# Patient Record
Sex: FEMALE | Race: BLACK OR AFRICAN AMERICAN | NOT HISPANIC OR LATINO | Employment: FULL TIME | URBAN - METROPOLITAN AREA
[De-identification: names, ages, dates, MRNs, and addresses within clinical notes are randomized per-mention and may not be internally consistent; named-entity substitution may affect disease eponyms.]

---

## 2017-12-22 ENCOUNTER — HOSPITAL ENCOUNTER (OUTPATIENT)
Dept: RADIOLOGY | Facility: OTHER | Age: 47
Discharge: HOME | End: 2017-12-22
Attending: SPECIALIST
Payer: COMMERCIAL

## 2017-12-22 DIAGNOSIS — Z12.31 VISIT FOR SCREENING MAMMOGRAM: ICD-10-CM

## 2017-12-22 PROCEDURE — 77067 SCR MAMMO BI INCL CAD: CPT | Mod: 26,,, | Performed by: INTERNAL MEDICINE

## 2017-12-22 PROCEDURE — 77067 SCR MAMMO BI INCL CAD: CPT | Mod: TC

## 2017-12-22 PROCEDURE — 77063 BREAST TOMOSYNTHESIS BI: CPT | Mod: 26,,, | Performed by: INTERNAL MEDICINE

## 2018-12-24 ENCOUNTER — HOSPITAL ENCOUNTER (OUTPATIENT)
Dept: RADIOLOGY | Facility: OTHER | Age: 48
Discharge: HOME | End: 2018-12-24
Attending: SPECIALIST
Payer: COMMERCIAL

## 2018-12-24 VITALS — WEIGHT: 280 LBS | BODY MASS INDEX: 46.65 KG/M2 | HEIGHT: 65 IN

## 2018-12-24 DIAGNOSIS — Z12.31 VISIT FOR SCREENING MAMMOGRAM: ICD-10-CM

## 2018-12-24 PROCEDURE — 77063 BREAST TOMOSYNTHESIS BI: CPT | Mod: TC

## 2018-12-24 PROCEDURE — 77063 BREAST TOMOSYNTHESIS BI: CPT | Mod: 26,,, | Performed by: RADIOLOGY

## 2018-12-24 PROCEDURE — 77067 SCR MAMMO BI INCL CAD: CPT | Mod: 26,,, | Performed by: RADIOLOGY

## 2020-07-28 DIAGNOSIS — Z12.31 VISIT FOR SCREENING MAMMOGRAM: Primary | ICD-10-CM

## 2020-12-17 ENCOUNTER — TELEPHONE (OUTPATIENT)
Dept: ENDOSCOPY | Facility: HOSPITAL | Age: 50
End: 2020-12-17

## 2020-12-17 NOTE — TELEPHONE ENCOUNTER
Referral for screening colonoscopy received from , [facility]- see below. Called patient to schedule procedure. Left voicemail message with direct number to call back.      Appointment Access  Message Contents   Patrica VARGAS [facility] Endo Schedulers   Caller: Unspecified (3 weeks ago)             Good Afternoon,     Dr. Rodrigues would like to refer the following patient to [facility] for a colonoscopy. I have a scanned the following patient's referral and records into . If there are any further questions in regards to the patient, please contact me at my extension.     Thank you,   Patrica Acosta

## 2020-12-30 ENCOUNTER — HOSPITAL ENCOUNTER (OUTPATIENT)
Dept: RADIOLOGY | Facility: CLINIC | Age: 50
Discharge: HOME | End: 2020-12-30
Attending: SPECIALIST
Payer: COMMERCIAL

## 2020-12-30 DIAGNOSIS — Z12.31 VISIT FOR SCREENING MAMMOGRAM: ICD-10-CM

## 2020-12-30 PROCEDURE — 77067 MAMMO DIGITAL SCREENING BILAT WITH TOMOSYNTHESIS_CAD: ICD-10-PCS | Mod: 26,,, | Performed by: RADIOLOGY

## 2020-12-30 PROCEDURE — 77067 SCR MAMMO BI INCL CAD: CPT | Mod: 26,,, | Performed by: RADIOLOGY

## 2020-12-30 PROCEDURE — 77067 SCR MAMMO BI INCL CAD: CPT | Mod: TC,PO

## 2020-12-30 PROCEDURE — 77063 MAMMO DIGITAL SCREENING BILAT WITH TOMOSYNTHESIS_CAD: ICD-10-PCS | Mod: 26,,, | Performed by: RADIOLOGY

## 2020-12-30 PROCEDURE — 77063 BREAST TOMOSYNTHESIS BI: CPT | Mod: 26,,, | Performed by: RADIOLOGY

## 2021-05-17 ENCOUNTER — TELEPHONE (OUTPATIENT)
Dept: SURGERY | Facility: CLINIC | Age: 51
End: 2021-05-17

## 2021-05-27 ENCOUNTER — TELEPHONE (OUTPATIENT)
Dept: ENDOSCOPY | Facility: HOSPITAL | Age: 51
End: 2021-05-27

## 2021-05-27 DIAGNOSIS — Z12.11 SCREEN FOR COLON CANCER: Primary | ICD-10-CM

## 2021-05-27 DIAGNOSIS — Z12.11 ENCOUNTER FOR SCREENING COLONOSCOPY: Primary | ICD-10-CM

## 2021-05-27 RX ORDER — CHOLECALCIFEROL (VITAMIN D3) 1250 MCG
TABLET ORAL
COMMUNITY
End: 2024-01-24 | Stop reason: SDUPTHER

## 2021-05-27 RX ORDER — POLYETHYLENE GLYCOL 3350, SODIUM SULFATE ANHYDROUS, SODIUM BICARBONATE, SODIUM CHLORIDE, POTASSIUM CHLORIDE 236; 22.74; 6.74; 5.86; 2.97 G/4L; G/4L; G/4L; G/4L; G/4L
4 POWDER, FOR SOLUTION ORAL ONCE
Qty: 4000 ML | Refills: 0 | Status: SHIPPED | OUTPATIENT
Start: 2021-05-27 | End: 2021-05-27

## 2021-05-27 RX ORDER — FLUTICASONE PROPIONATE 250 UG/1
1 POWDER, METERED RESPIRATORY (INHALATION) 2 TIMES DAILY
COMMUNITY
End: 2023-12-20

## 2021-05-27 RX ORDER — SODIUM, POTASSIUM,MAG SULFATES 17.5-3.13G
1 SOLUTION, RECONSTITUTED, ORAL ORAL DAILY
Qty: 1 KIT | Refills: 0 | Status: SHIPPED | OUTPATIENT
Start: 2021-05-27 | End: 2021-05-29

## 2021-05-27 RX ORDER — ALBUTEROL SULFATE 90 UG/1
2 AEROSOL, METERED RESPIRATORY (INHALATION) EVERY 6 HOURS PRN
COMMUNITY
End: 2023-12-20 | Stop reason: SDUPTHER

## 2021-06-25 ENCOUNTER — HOSPITAL ENCOUNTER (OUTPATIENT)
Facility: HOSPITAL | Age: 51
Discharge: HOME | End: 2021-06-25
Attending: SURGERY | Admitting: SURGERY
Payer: COMMERCIAL

## 2021-06-25 ENCOUNTER — ANESTHESIA (OUTPATIENT)
Dept: ENDOSCOPY | Facility: HOSPITAL | Age: 51
End: 2021-06-25
Payer: COMMERCIAL

## 2021-06-25 ENCOUNTER — ANESTHESIA EVENT (OUTPATIENT)
Dept: ENDOSCOPY | Facility: HOSPITAL | Age: 51
End: 2021-06-25
Payer: COMMERCIAL

## 2021-06-25 VITALS
BODY MASS INDEX: 46.32 KG/M2 | SYSTOLIC BLOOD PRESSURE: 153 MMHG | RESPIRATION RATE: 20 BRPM | OXYGEN SATURATION: 100 % | HEART RATE: 55 BPM | TEMPERATURE: 97 F | HEIGHT: 65 IN | WEIGHT: 278 LBS | DIASTOLIC BLOOD PRESSURE: 95 MMHG

## 2021-06-25 LAB
B-HCG UR QL: NEGATIVE
CTP QC/QA: YES

## 2021-06-25 PROCEDURE — E9220 PRA ENDO ANESTHESIA: HCPCS | Mod: ,,, | Performed by: NURSE ANESTHETIST, CERTIFIED REGISTERED

## 2021-06-25 PROCEDURE — 81025 URINE PREGNANCY TEST: CPT | Performed by: SURGERY

## 2021-06-25 PROCEDURE — 37000008 HC ANESTHESIA 1ST 15 MINUTES: Performed by: SURGERY

## 2021-06-25 PROCEDURE — 37000009 HC ANESTHESIA EA ADD 15 MINS: Performed by: SURGERY

## 2021-06-25 PROCEDURE — G0121 COLON CA SCRN NOT HI RSK IND: HCPCS | Performed by: SURGERY

## 2021-06-25 PROCEDURE — E9220 PRA ENDO ANESTHESIA: ICD-10-PCS | Mod: ,,, | Performed by: NURSE ANESTHETIST, CERTIFIED REGISTERED

## 2021-06-25 PROCEDURE — 63600175 PHARM REV CODE 636 W HCPCS: Performed by: NURSE ANESTHETIST, CERTIFIED REGISTERED

## 2021-06-25 PROCEDURE — 25000003 PHARM REV CODE 250: Performed by: SURGERY

## 2021-06-25 PROCEDURE — G0121 COLON CA SCRN NOT HI RSK IND: ICD-10-PCS | Mod: ,,, | Performed by: SURGERY

## 2021-06-25 PROCEDURE — G0121 COLON CA SCRN NOT HI RSK IND: HCPCS | Mod: ,,, | Performed by: SURGERY

## 2021-06-25 RX ORDER — PROPOFOL 10 MG/ML
VIAL (ML) INTRAVENOUS
Status: DISCONTINUED | OUTPATIENT
Start: 2021-06-25 | End: 2021-06-25

## 2021-06-25 RX ORDER — PROPOFOL 10 MG/ML
VIAL (ML) INTRAVENOUS CONTINUOUS PRN
Status: DISCONTINUED | OUTPATIENT
Start: 2021-06-25 | End: 2021-06-25

## 2021-06-25 RX ORDER — SODIUM CHLORIDE 9 MG/ML
INJECTION, SOLUTION INTRAVENOUS CONTINUOUS
Status: DISCONTINUED | OUTPATIENT
Start: 2021-06-25 | End: 2021-06-25 | Stop reason: HOSPADM

## 2021-06-25 RX ORDER — LIDOCAINE HCL/PF 100 MG/5ML
SYRINGE (ML) INTRAVENOUS
Status: DISCONTINUED | OUTPATIENT
Start: 2021-06-25 | End: 2021-06-25

## 2021-06-25 RX ADMIN — Medication 50 MG: at 12:06

## 2021-06-25 RX ADMIN — PROPOFOL 150 MCG/KG/MIN: 10 INJECTION, EMULSION INTRAVENOUS at 12:06

## 2021-06-25 RX ADMIN — PROPOFOL 70 MG: 10 INJECTION, EMULSION INTRAVENOUS at 12:06

## 2021-06-25 RX ADMIN — SODIUM CHLORIDE: 0.9 INJECTION, SOLUTION INTRAVENOUS at 12:06

## 2022-01-07 ENCOUNTER — HOSPITAL ENCOUNTER (OUTPATIENT)
Dept: RADIOLOGY | Facility: OTHER | Age: 52
Discharge: HOME | End: 2022-01-07
Attending: SPECIALIST
Payer: COMMERCIAL

## 2022-01-07 DIAGNOSIS — Z12.31 VISIT FOR SCREENING MAMMOGRAM: ICD-10-CM

## 2022-01-07 PROCEDURE — 77067 SCR MAMMO BI INCL CAD: CPT | Mod: 26,,, | Performed by: RADIOLOGY

## 2022-01-07 PROCEDURE — 77067 MAMMO DIGITAL SCREENING BILAT WITH TOMO: ICD-10-PCS | Mod: 26,,, | Performed by: RADIOLOGY

## 2022-01-07 PROCEDURE — 77063 BREAST TOMOSYNTHESIS BI: CPT | Mod: TC

## 2022-01-07 PROCEDURE — 77063 MAMMO DIGITAL SCREENING BILAT WITH TOMO: ICD-10-PCS | Mod: 26,,, | Performed by: RADIOLOGY

## 2022-01-07 PROCEDURE — 77063 BREAST TOMOSYNTHESIS BI: CPT | Mod: 26,,, | Performed by: RADIOLOGY

## 2022-01-07 PROCEDURE — 77067 SCR MAMMO BI INCL CAD: CPT | Mod: TC

## 2022-01-21 ENCOUNTER — HOSPITAL ENCOUNTER (OUTPATIENT)
Dept: RADIOLOGY | Facility: OTHER | Age: 52
Discharge: HOME | End: 2022-01-21
Attending: SPECIALIST
Payer: COMMERCIAL

## 2022-01-21 DIAGNOSIS — N95.0 PMB (POSTMENOPAUSAL BLEEDING): ICD-10-CM

## 2022-01-21 PROCEDURE — 76830 US PELVIS COMP WITH TRANSVAG NON-OB (XPD): ICD-10-PCS | Mod: 26,,, | Performed by: RADIOLOGY

## 2022-01-21 PROCEDURE — 76856 US PELVIS COMP WITH TRANSVAG NON-OB (XPD): ICD-10-PCS | Mod: 26,,, | Performed by: RADIOLOGY

## 2022-01-21 PROCEDURE — 76856 US EXAM PELVIC COMPLETE: CPT | Mod: 26,,, | Performed by: RADIOLOGY

## 2022-01-21 PROCEDURE — 76830 TRANSVAGINAL US NON-OB: CPT | Mod: TC

## 2022-01-21 PROCEDURE — 76830 TRANSVAGINAL US NON-OB: CPT | Mod: 26,,, | Performed by: RADIOLOGY

## 2022-12-23 ENCOUNTER — OFFICE VISIT (OUTPATIENT)
Dept: OBSTETRICS AND GYNECOLOGY | Facility: CLINIC | Age: 52
End: 2022-12-23
Payer: COMMERCIAL

## 2022-12-23 VITALS — HEIGHT: 65 IN | DIASTOLIC BLOOD PRESSURE: 80 MMHG | SYSTOLIC BLOOD PRESSURE: 128 MMHG | BODY MASS INDEX: 46.26 KG/M2

## 2022-12-23 DIAGNOSIS — Z12.31 ENCOUNTER FOR SCREENING MAMMOGRAM FOR BREAST CANCER: Primary | ICD-10-CM

## 2022-12-23 DIAGNOSIS — Z91.89 AT INCREASED RISK OF BREAST CANCER: ICD-10-CM

## 2022-12-23 PROCEDURE — 99999 PR PBB SHADOW E&M-EST. PATIENT-LVL III: CPT | Mod: PBBFAC,,, | Performed by: REGISTERED NURSE

## 2022-12-23 PROCEDURE — 99999 PR PBB SHADOW E&M-EST. PATIENT-LVL III: ICD-10-PCS | Mod: PBBFAC,,, | Performed by: REGISTERED NURSE

## 2022-12-23 PROCEDURE — 99203 OFFICE O/P NEW LOW 30 MIN: CPT | Mod: S$GLB,,, | Performed by: REGISTERED NURSE

## 2022-12-23 PROCEDURE — 1159F PR MEDICATION LIST DOCUMENTED IN MEDICAL RECORD: ICD-10-PCS | Mod: CPTII,S$GLB,, | Performed by: REGISTERED NURSE

## 2022-12-23 PROCEDURE — 3008F PR BODY MASS INDEX (BMI) DOCUMENTED: ICD-10-PCS | Mod: CPTII,S$GLB,, | Performed by: REGISTERED NURSE

## 2022-12-23 PROCEDURE — 3074F SYST BP LT 130 MM HG: CPT | Mod: CPTII,S$GLB,, | Performed by: REGISTERED NURSE

## 2022-12-23 PROCEDURE — 3074F PR MOST RECENT SYSTOLIC BLOOD PRESSURE < 130 MM HG: ICD-10-PCS | Mod: CPTII,S$GLB,, | Performed by: REGISTERED NURSE

## 2022-12-23 PROCEDURE — 1159F MED LIST DOCD IN RCRD: CPT | Mod: CPTII,S$GLB,, | Performed by: REGISTERED NURSE

## 2022-12-23 PROCEDURE — 3008F BODY MASS INDEX DOCD: CPT | Mod: CPTII,S$GLB,, | Performed by: REGISTERED NURSE

## 2022-12-23 PROCEDURE — 99203 PR OFFICE/OUTPT VISIT, NEW, LEVL III, 30-44 MIN: ICD-10-PCS | Mod: S$GLB,,, | Performed by: REGISTERED NURSE

## 2022-12-23 PROCEDURE — 1160F RVW MEDS BY RX/DR IN RCRD: CPT | Mod: CPTII,S$GLB,, | Performed by: REGISTERED NURSE

## 2022-12-23 PROCEDURE — 3079F PR MOST RECENT DIASTOLIC BLOOD PRESSURE 80-89 MM HG: ICD-10-PCS | Mod: CPTII,S$GLB,, | Performed by: REGISTERED NURSE

## 2022-12-23 PROCEDURE — 3079F DIAST BP 80-89 MM HG: CPT | Mod: CPTII,S$GLB,, | Performed by: REGISTERED NURSE

## 2022-12-23 PROCEDURE — 1160F PR REVIEW ALL MEDS BY PRESCRIBER/CLIN PHARMACIST DOCUMENTED: ICD-10-PCS | Mod: CPTII,S$GLB,, | Performed by: REGISTERED NURSE

## 2022-12-23 NOTE — PROGRESS NOTES
CC: Transfer of Care- Clinical Breast Exam    HPI: Pt is a 52 y.o.  female who presents for transfer of care from Dr. Pittman at [facility]. She desires clinical breast exam today and order for mammogram to complete in January 2023. Mammogram in 01/2022 was WNL, with TC score of 25.29%. Pt reports family history of maternal aunt with breast cancer (diagnosed in ?30's) and older sister diagnosed with breast cancer in her 40's. Her sister had radiation to treat. Denies family history of colon, ovarian and endometrial cancer. Last menstrual period was in 2019. Reports episode of PMB, for which she had US complete; none since that time. Reports history of fibroids. She reports that last annual exam with Dr. Pittman was in May, 2022 and she had pap done at that time, which was normal. She wishes to defer Annual to May 2023.  She does not exercise; she does not smoke.     ROS:  GENERAL: Feeling well overall. Denies fever or chills.   SKIN: Denies rash or lesions.   NODES: Denies enlarged lymph nodes.   CHEST: Denies chest pain or shortness of breath.   CARDIOVASCULAR: Denies palpitations or left sided chest pain.   ABDOMEN: No abdominal pain, constipation, diarrhea, nausea, vomiting or rectal bleeding.   URINARY: No dysuria, hematuria, or burning on urination.  REPRODUCTIVE: See HPI.   BREASTS: Denies pain, lumps, or nipple discharge.     PE:   APPEARANCE: Well nourished, well developed, Black or  female in no acute distress.  BREASTS: Symmetrical, no skin changes or visible lesions. No palpable masses, nipple discharge or adenopathy bilaterally.   PELVID: Deferred.      Diagnosis:  1. Encounter for screening mammogram for breast cancer    2. At increased risk of breast cancer        Plan:   Mammo- scheduled  Discussed referral to [facility] due to increased TC score- will place referral  Reviewed pelvic US with patient- pt wishes to monitor for PMB; declines repeat PUS at this time.  Pt  to acquire records and FU for Annual exam as indicated.        JOSE D Rowley     Answers submitted by the patient for this visit:  Gynecologic Exam Questionnaire  (Submitted on 2022)  Chief Complaint: Gynecologic exam  genital itching: No  genital lesions: No  genital odor: No  genital rash: No  missed menses: No  pelvic pain: No  vaginal bleeding: No  vaginal discharge: No  Pregnant now?: No  abdominal pain: No  anorexia: No  back pain: Yes  chills: No  constipation: No  diarrhea: No  discolored urine: No  dysuria: No  fever: No  flank pain: No  frequency: No  headaches: No  hematuria: No  nausea: No  painful intercourse: No  rash: No  urgency: No  vomiting: No  Passing clots?: No  Passing tissue?: No  STD: No  abdominal surgery: No   section: No  Ectopic pregnancy: No  Endometriosis: No  herpes simplex: No  gynecological surgery: No  menorrhagia: No  metrorrhagia: No  miscarriage: No  ovarian cysts: No  perineal abscess: No  PID: No  terminated pregnancy: No  vaginosis: No

## 2023-02-17 ENCOUNTER — HOSPITAL ENCOUNTER (OUTPATIENT)
Dept: RADIOLOGY | Facility: OTHER | Age: 53
Discharge: HOME | End: 2023-02-17
Attending: REGISTERED NURSE
Payer: COMMERCIAL

## 2023-02-17 DIAGNOSIS — Z12.31 ENCOUNTER FOR SCREENING MAMMOGRAM FOR BREAST CANCER: ICD-10-CM

## 2023-02-17 PROBLEM — Z91.89 AT HIGH RISK FOR BREAST CANCER: Status: ACTIVE | Noted: 2023-02-17

## 2023-02-17 PROCEDURE — 77067 SCR MAMMO BI INCL CAD: CPT | Mod: 26,,, | Performed by: RADIOLOGY

## 2023-02-17 PROCEDURE — 77063 MAMMO DIGITAL SCREENING BILAT WITH TOMO: ICD-10-PCS | Mod: 26,,, | Performed by: RADIOLOGY

## 2023-02-17 PROCEDURE — 77063 BREAST TOMOSYNTHESIS BI: CPT | Mod: 26,,, | Performed by: RADIOLOGY

## 2023-02-17 PROCEDURE — 77067 MAMMO DIGITAL SCREENING BILAT WITH TOMO: ICD-10-PCS | Mod: 26,,, | Performed by: RADIOLOGY

## 2023-02-17 PROCEDURE — 77067 SCR MAMMO BI INCL CAD: CPT | Mod: TC

## 2023-02-17 NOTE — PROGRESS NOTES
"Reason For Consultation:   Increased lifetime risk of breast cancer    Referring Provider:   Oliva Hernández, FRANK  [address]  [address]  [place] [address]    Records Obtained: Records of the patients history including those obtained from the referring provider were reviewed and summarized in detail.    HPI:   Jamila Bolton presents for consultation of increased risk of breast cancer. She is postmenopausal. She presented for screening mammogram on 23 which was benign but revealed a Tyrer-Cuzick score of 25.01%.       Today, Feels good and no complaints.   No breast concerns.    High Risk Breast cancer specific history:  - Age: 52 y.o.   - Height:  5'5"  - Weight: 134.9 kg  - Breast density per BI-RADS: Scattered Fibroglandular Density     - Age at menarche:  [age]  - Number of pregnancies: ;   - Age at menopause, if applicable: 48.   -Uterus and ovaries intact: Yes  - HRT: No      - Genetic testing: No; Done with Dr. Rosales   - Personal history of cancer: No  - Previous chest radiation exposure between ages 10-30 years old: No  - Personal history of breast biopsy: No  - Ashkenazi Uatsdin Inheritance: No  - Family history of cancer:     Sister Breast Cancer diagnosed age 45; alive 57 - DCIS treated with Dr. Lind in [place]; María Crow    Maternal Aunt Breast Cancer diagnosed in her 40's?    Paternal Grandfather prostate cancer    Social History:  Tobacco use:  None  Alcohol use:  Occasionally - Wine  Exercise regimen: Moves around at work when she can  Employment: just got laid off    SEE CALCULATED RISK BELOW.     Past Medical   Past Medical History:   Diagnosis Date    Asthma      Patient Active Problem List   Diagnosis    At high risk for breast cancer     Social History   Social History     Tobacco Use    Smoking status: Never   Substance Use Topics    Alcohol use: Yes     Alcohol/week: 4.0 standard drinks     Types: 4 Glasses of wine per week     Family History  Family History " "  Problem Relation Age of Onset    Breast cancer Sister 45    Breast cancer Maternal Aunt     Cancer Neg Hx         colon      Medications    Current Outpatient Medications:     albuterol (PROVENTIL/VENTOLIN HFA) 90 mcg/actuation inhaler, Inhale 2 puffs into the lungs every 6 (six) hours as needed for Wheezing. Rescue, Disp: , Rfl:     cholecalciferol, vitamin D3, 1,250 mcg (50,000 unit) Tab, Take by mouth., Disp: , Rfl:     fluticasone propionate (FLOVENT DISKUS) 250 mcg/actuation DsDv, Inhale 1 puff into the lungs 2 (two) times a day. Controller, Disp: , Rfl:   Allergies  Review of patient's allergies indicates:   Allergen Reactions    Aspirin Other (See Comments)    Sinus allergy Rash       Review of Systems       See above   Review of Systems  Review of Systems  All other systems reviewed and are negative.    Objective:      Vitals:   Vitals:    03/03/23 1105   BP: 125/63   Pulse: 73   Resp: 18   Temp: 98.2 °F (36.8 °C)   TempSrc: Oral   SpO2: 96%   Weight: 134.9 kg (297 lb 6.4 oz)   Height: 5' 5" (1.651 m)     BMI: Body mass index is 49.49 kg/m².   Body surface area is 2.49 meters squared.    Physical Exam  Vitals and nursing note reviewed.   Constitutional:       General: She is not in acute distress.     Appearance: Normal appearance. She is well-developed.   HENT:      Head: Normocephalic.   Cardiovascular:      Rate and Rhythm: Normal rate and regular rhythm.      Heart sounds: Normal heart sounds.   Pulmonary:      Effort: Pulmonary effort is normal.      Breath sounds: Normal breath sounds.   Chest:   Breasts:     Right: Normal.      Left: Normal.   Musculoskeletal:      Cervical back: Normal range of motion.   Lymphadenopathy:      Cervical: No cervical adenopathy.      Upper Body:      Right upper body: No supraclavicular or axillary adenopathy.      Left upper body: No supraclavicular or axillary adenopathy.   Skin:     General: Skin is warm and dry.      Findings: No rash.   Neurological:      Mental " Status: She is alert and oriented to person, place, and time.   Psychiatric:         Behavior: Behavior normal.         Laboratory Data: reviewed most recent   Imaging: reviewed most recent        Assessment:     1. At high risk for breast cancer        1. Increased risk of breast cancer   * Tyrer-Cuzick (TC) lifetime risk of 27.6%. We discussed that TC score will categorize your lifetime risk of being diagnosed with breast cancer. Categories are as such: Average risk <15%, Intermediate risk 15-19%, and High risk > or = to 20%.    * The Thea Model for Breast Cancer risk: She has a 2.1% 5-year breast cancer risk (Compared with 1.4% for the average 54 y.o. woman) and 12.5% Lifetime breast cancer risk (Compared with 8.2% for the average 54 y.o. woman). A woman's risk is considered low if her five-year risk of developing breast cancer is less than 1.6%; it is considered high if she scores above 1.66%. (All women who are over 60 have a score of at least 1.66 and are considered high risk, based on the Thea Model.)      Risk factors are categorized into 2 groups: Modifiable and Non-modifiable. Modifiable risk factors include use of hormones, alcohol, smoking, diet and exercise. Non-modifiable risk factors include breast density, genetics, chest radiation, previous pregnancies, age of first period, and age of menopause.    * For women at high risk for breast cancer, endocrine therapy can reduce the risk of invasive and/or in situ breast cancers. (tamoxifen for premenopausal or postmenopausal women and raloxifene or exemestane for postmenopausal women).   * Discussed that Tamoxifen 20 mg daily for 5 years has shown to reduce risk of breast cancer by 49% and women with ADH/ALH or LCIS have an even more significant reduction of risk of 86%. Aromatase inhibitors for 5 years have also shown risk reduction in terms of 50-60%. At current, there is not adequate data to recommend longer courses of therapy more than 5 years for risk  reduction.    * Tamoxifen has limited data in BRCA 1/2 mutation carriers but limited retrospective data is suggestive of benefit. There is retrospective data that aromatase inhibitors can reduce the risk of contralateral ER positive breast cancers in BRCA 1/2 patients who were taking AIs as adjuvant therapy. There is no data for raloxifen in the population.    * Reviewed risks of Tamoxifen side effects include hot flashes, invasive endometrial cancer in women > 49 years of age (2.3/1000 compared to 0.9/1000), cataracts, increased risk of pulmonary embolism among others.  * Reviewed Lifestyle modifications which have shown benefit:  Limit alcohol consumption to less than 1 drink per day (1 ounce liquor, 6 oz wine, 8 oz beer)  Avoid smoking.  Exercise at least 150 minutes per week of moderate intensity aerobic activity or at least 75 minutes of vigorous activity. Exercise can lower the relative risk of breast cancer by ~18-20%.  Maintain healthy weight and avoid post-menopausal weight gain. Avoid processed foods and eat more lean proteins, fruits and vegetables.                * Discussed available resources including genetic counseling, nutrition, weight management.    * Reviewed future screening:   Semiannual (every 6 months) CBE (clinical breast exam).   Annual Breast MRI alternating with an annual MMG. if TC 20% or greater.                   Discussed with patient that there are several models available for stratifying breast cancer risk, and Tyrer-Cuzick is presently the model utilized by [facility] and is a model recommended per current NCCN guidelines.    The Thea Model for Breast Cancer risk estimates the absolute 5 year risk and lifetime risk of developing breast cancer. Family history includes only first degree relatives with breast cancer, which is not enough information to estimate the risk of a patient having BRCA mutation. It also underestimates the cancer risk for patients with extensive  family history. The Thea Model is a good predictor of risk for populations but not for individuals. It adjusts risk for race/ethnicity. It may underestimate breast cancer risk in patients with atypical hyperplasia and strong family history. The Thea Model was NOT designed to estimate risk for: Women with a prior diagnosis of breast cancer, lobular carcinoma in situ (LCIS), or ductal carcinoma in situ (DCIS);  Women who have received previous radiation therapy to the chest for treatment of Hodgkin lymphoma;  Women with gene mutations in BRCA1 or BRCA2, or those who are known to have certain genetic syndromes that increase risk for breast cancer; Women of age <35 or >85.    We discussed that there are limitations to every model for risk assessment, particularly that TC can overestimate risk in women with atypical hyperplasia and dense breasts and that Thea underestimates risk for those with a strong family history of breast or ovarian cancers as well as non-white women with atypical hyperplasia which can make them appear to not be candidates for risk reducing therapies.       Plan:     Patient has opted out chemoprevention but will call in the future if she wishes to pursue.   Patient elects to proceed with alternating annual mammogram and annual breast MRI along with semiannual CBEs.  Patient will follow up with PCP or GYN for one semiannual CBE along with annual mammogram in Feb 2024 and will RTC here for one semiannual CBE along with annual breast MRI in Aug 2023. She is lookin to move to [place], will get her images in  and can still follow up with me virtually for high risk clinic.   Lifestyle modifications as detailed above.   5.   Encouraged breast awareness, including monthly breast self-exams.   6.   Genetic testing done with Dr. Pittman (GYN) at [facility] in 6 months to see me either at [facility] or on 3rd floor If she has moved will delay visit to 1 year.     Questions were encouraged and  answered to patient's satisfaction, and patient verbalized understanding of information and agreement with the plan. Advised patient to RTC with any interval changes or concerns.        Patient is in agreement with the proposed treatment plan. All questions were answered to the patient's satisfaction. Patient knows to call clinic for any new or worsening symptoms and if anything is needed before the next clinic visit.          Flavia Pinzon, FNP-C  Hematology & Medical Oncology   [address] [place] [address]  ph. [phone]  Fax. [phone]    Collaborating physician, Dr. Moseley.    Total time spent with the patient: 40 minutes, 30 minutes of face to face consultation and 10 minutes of chart review and coordination of care, on the day of the visit. This includes face to face time and non-face to face time preparing to see the patient (eg, review of tests), obtaining and/or reviewing separately obtained history, documenting clinical information in the electronic or other health record, independently interpreting resultsand communicating results to the patient/family/caregiver, or care coordination.    Route Chart for Scheduling    Med Onc Chart Routing      Follow up with physician    Follow up with DEBBIE 6 months. see me after MRI   Infusion scheduling note    Injection scheduling note    Labs    Imaging Mammogram and MRI   MRI in Aug, mammo in Feb   Pharmacy appointment    Other referrals

## 2023-03-02 ENCOUNTER — TELEPHONE (OUTPATIENT)
Dept: HEMATOLOGY/ONCOLOGY | Facility: CLINIC | Age: 53
End: 2023-03-02

## 2023-03-03 ENCOUNTER — OFFICE VISIT (OUTPATIENT)
Dept: HEMATOLOGY/ONCOLOGY | Facility: CLINIC | Age: 53
End: 2023-03-03
Payer: COMMERCIAL

## 2023-03-03 VITALS
DIASTOLIC BLOOD PRESSURE: 63 MMHG | SYSTOLIC BLOOD PRESSURE: 125 MMHG | HEART RATE: 73 BPM | HEIGHT: 65 IN | BODY MASS INDEX: 48.82 KG/M2 | TEMPERATURE: 98 F | OXYGEN SATURATION: 96 % | WEIGHT: 293 LBS | RESPIRATION RATE: 18 BRPM

## 2023-03-03 DIAGNOSIS — Z80.3 FAMILY HISTORY OF BREAST CANCER IN SISTER: Primary | ICD-10-CM

## 2023-03-03 DIAGNOSIS — Z91.89 AT HIGH RISK FOR BREAST CANCER: ICD-10-CM

## 2023-03-03 DIAGNOSIS — Z91.89 AT INCREASED RISK OF BREAST CANCER: ICD-10-CM

## 2023-03-03 PROCEDURE — 3008F BODY MASS INDEX DOCD: CPT | Mod: CPTII,S$GLB,, | Performed by: NURSE PRACTITIONER

## 2023-03-03 PROCEDURE — 3078F DIAST BP <80 MM HG: CPT | Mod: CPTII,S$GLB,, | Performed by: NURSE PRACTITIONER

## 2023-03-03 PROCEDURE — 1159F MED LIST DOCD IN RCRD: CPT | Mod: CPTII,S$GLB,, | Performed by: NURSE PRACTITIONER

## 2023-03-03 PROCEDURE — 3008F PR BODY MASS INDEX (BMI) DOCUMENTED: ICD-10-PCS | Mod: CPTII,S$GLB,, | Performed by: NURSE PRACTITIONER

## 2023-03-03 PROCEDURE — 3078F PR MOST RECENT DIASTOLIC BLOOD PRESSURE < 80 MM HG: ICD-10-PCS | Mod: CPTII,S$GLB,, | Performed by: NURSE PRACTITIONER

## 2023-03-03 PROCEDURE — 1160F RVW MEDS BY RX/DR IN RCRD: CPT | Mod: CPTII,S$GLB,, | Performed by: NURSE PRACTITIONER

## 2023-03-03 PROCEDURE — 99999 PR PBB SHADOW E&M-EST. PATIENT-LVL V: CPT | Mod: PBBFAC,,, | Performed by: NURSE PRACTITIONER

## 2023-03-03 PROCEDURE — 1159F PR MEDICATION LIST DOCUMENTED IN MEDICAL RECORD: ICD-10-PCS | Mod: CPTII,S$GLB,, | Performed by: NURSE PRACTITIONER

## 2023-03-03 PROCEDURE — 3074F PR MOST RECENT SYSTOLIC BLOOD PRESSURE < 130 MM HG: ICD-10-PCS | Mod: CPTII,S$GLB,, | Performed by: NURSE PRACTITIONER

## 2023-03-03 PROCEDURE — 3074F SYST BP LT 130 MM HG: CPT | Mod: CPTII,S$GLB,, | Performed by: NURSE PRACTITIONER

## 2023-03-03 PROCEDURE — 99999 PR PBB SHADOW E&M-EST. PATIENT-LVL V: ICD-10-PCS | Mod: PBBFAC,,, | Performed by: NURSE PRACTITIONER

## 2023-03-03 PROCEDURE — 1160F PR REVIEW ALL MEDS BY PRESCRIBER/CLIN PHARMACIST DOCUMENTED: ICD-10-PCS | Mod: CPTII,S$GLB,, | Performed by: NURSE PRACTITIONER

## 2023-03-03 PROCEDURE — 99214 OFFICE O/P EST MOD 30 MIN: CPT | Mod: S$GLB,,, | Performed by: NURSE PRACTITIONER

## 2023-03-03 PROCEDURE — 99214 PR OFFICE/OUTPT VISIT, EST, LEVL IV, 30-39 MIN: ICD-10-PCS | Mod: S$GLB,,, | Performed by: NURSE PRACTITIONER

## 2023-03-03 RX ORDER — LORATADINE 10 MG/1
10 TABLET ORAL
COMMUNITY
Start: 2023-02-22

## 2023-03-15 ENCOUNTER — OFFICE VISIT (OUTPATIENT)
Dept: OBSTETRICS AND GYNECOLOGY | Facility: CLINIC | Age: 53
End: 2023-03-15
Payer: COMMERCIAL

## 2023-03-15 VITALS
HEIGHT: 65 IN | BODY MASS INDEX: 48.82 KG/M2 | WEIGHT: 293 LBS | DIASTOLIC BLOOD PRESSURE: 68 MMHG | SYSTOLIC BLOOD PRESSURE: 128 MMHG

## 2023-03-15 DIAGNOSIS — Z11.51 SCREENING FOR HPV (HUMAN PAPILLOMAVIRUS): ICD-10-CM

## 2023-03-15 DIAGNOSIS — Z12.4 ENCOUNTER FOR PAPANICOLAOU SMEAR FOR CERVICAL CANCER SCREENING: ICD-10-CM

## 2023-03-15 DIAGNOSIS — Z01.419 WOMEN'S ANNUAL ROUTINE GYNECOLOGICAL EXAMINATION: Primary | ICD-10-CM

## 2023-03-15 PROCEDURE — 1160F PR REVIEW ALL MEDS BY PRESCRIBER/CLIN PHARMACIST DOCUMENTED: ICD-10-PCS | Mod: CPTII,S$GLB,, | Performed by: REGISTERED NURSE

## 2023-03-15 PROCEDURE — 3078F DIAST BP <80 MM HG: CPT | Mod: CPTII,S$GLB,, | Performed by: REGISTERED NURSE

## 2023-03-15 PROCEDURE — 99396 PREV VISIT EST AGE 40-64: CPT | Mod: S$GLB,,, | Performed by: REGISTERED NURSE

## 2023-03-15 PROCEDURE — 1159F MED LIST DOCD IN RCRD: CPT | Mod: CPTII,S$GLB,, | Performed by: REGISTERED NURSE

## 2023-03-15 PROCEDURE — 3074F SYST BP LT 130 MM HG: CPT | Mod: CPTII,S$GLB,, | Performed by: REGISTERED NURSE

## 2023-03-15 PROCEDURE — 88175 CYTOPATH C/V AUTO FLUID REDO: CPT | Performed by: REGISTERED NURSE

## 2023-03-15 PROCEDURE — 87624 HPV HI-RISK TYP POOLED RSLT: CPT | Performed by: REGISTERED NURSE

## 2023-03-15 PROCEDURE — 3008F PR BODY MASS INDEX (BMI) DOCUMENTED: ICD-10-PCS | Mod: CPTII,S$GLB,, | Performed by: REGISTERED NURSE

## 2023-03-15 PROCEDURE — 3074F PR MOST RECENT SYSTOLIC BLOOD PRESSURE < 130 MM HG: ICD-10-PCS | Mod: CPTII,S$GLB,, | Performed by: REGISTERED NURSE

## 2023-03-15 PROCEDURE — 1160F RVW MEDS BY RX/DR IN RCRD: CPT | Mod: CPTII,S$GLB,, | Performed by: REGISTERED NURSE

## 2023-03-15 PROCEDURE — 99999 PR PBB SHADOW E&M-EST. PATIENT-LVL III: ICD-10-PCS | Mod: PBBFAC,,, | Performed by: REGISTERED NURSE

## 2023-03-15 PROCEDURE — 99396 PR PREVENTIVE VISIT,EST,40-64: ICD-10-PCS | Mod: S$GLB,,, | Performed by: REGISTERED NURSE

## 2023-03-15 PROCEDURE — 3008F BODY MASS INDEX DOCD: CPT | Mod: CPTII,S$GLB,, | Performed by: REGISTERED NURSE

## 2023-03-15 PROCEDURE — 3078F PR MOST RECENT DIASTOLIC BLOOD PRESSURE < 80 MM HG: ICD-10-PCS | Mod: CPTII,S$GLB,, | Performed by: REGISTERED NURSE

## 2023-03-15 PROCEDURE — 99999 PR PBB SHADOW E&M-EST. PATIENT-LVL III: CPT | Mod: PBBFAC,,, | Performed by: REGISTERED NURSE

## 2023-03-15 PROCEDURE — 1159F PR MEDICATION LIST DOCUMENTED IN MEDICAL RECORD: ICD-10-PCS | Mod: CPTII,S$GLB,, | Performed by: REGISTERED NURSE

## 2023-03-15 NOTE — PROGRESS NOTES
CC: Annual  HPI: Pt is a 52 y.o.  female who presents for routine annual exam. She is christine-menopausal. She does not want STD screening.  Denies any GYN complaints.   The patient does not smoke.  The patient wears seatbelts.   Pt denies any domestic violence. 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      FH:  Breast cancer: maternal aunt with breast cancer (diagnosed in ?30's) and older sister diagnosed with breast cancer in her 40's. Her sister had radiation to treat.  Colon cancer: none  Ovarian cancer: none  Endometrial cancer: none    ROS:  GENERAL: Feeling well overall. Denies fever or chills.   SKIN: Denies rash or lesions.   HEAD: Denies head injury or headache.   NODES: Denies enlarged lymph nodes.   CHEST: Denies chest pain or shortness of breath.   CARDIOVASCULAR: Denies palpitations or left sided chest pain.   ABDOMEN: No abdominal pain, constipation, diarrhea, nausea, vomiting or rectal bleeding.   URINARY: No dysuria, hematuria, or burning on urination.  REPRODUCTIVE: See HPI.   BREASTS: Denies pain, lumps, or nipple discharge.   HEMATOLOGIC: No easy bruisability or excessive bleeding.   MUSCULOSKELETAL: Denies joint pain or swelling.   NEUROLOGIC: Denies syncope or weakness.   PSYCHIATRIC: Denies depression, anxiety or mood swings.    PE:   APPEARANCE: Well nourished, well developed, Black or  female in no acute distress.  NODES: no cervical, supraclavicular, or inguinal lymphadenopathy  BREASTS: Symmetrical, no skin changes or visible lesions. No palpable masses, nipple discharge or adenopathy bilaterally.  ABDOMEN: Soft. No tenderness or masses. No distention. No hernias palpated. No CVA tenderness.  VULVA: No lesions. Normal external female genitalia.  URETHRAL MEATUS: Normal size and location, no lesions, no prolapse.  URETHRA: No masses, tenderness, or prolapse.  VAGINA: Moist. No lesions or lacerations noted. No abnormal discharge present. No odor present.    CERVIX: No lesions or discharge. No cervical motion tenderness.   UTERUS: Exam limited by body habitus.   ADNEXA: Exam limited by body habitus.   ANUS PERINEUM: Normal.      Diagnosis:  1. Encounter for Papanicolaou smear for cervical cancer screening    2. Screening for HPV (human papillomavirus)        Plan:   Pap/HPV co-testing  Continue breast monitoring with Q6 mo alternating mammogram/MRI (last mammo: 02/2023- WNL). MRI due 08/2023.       Patient was counseled today on the new ACS guidelines for cervical cytology screening as well as the current recommendations for breast cancer screening. She was counseled to follow up with her PCP for other routine health maintenance. Counseling session lasted approximately 10 minutes, and all her questions were answered.    Follow-up with me in 1 year for routine exam.        JOSE D Rowley

## 2023-03-21 LAB
HPV HR 12 DNA SPEC QL NAA+PROBE: NEGATIVE
HPV16 AG SPEC QL: NEGATIVE
HPV18 DNA SPEC QL NAA+PROBE: NEGATIVE

## 2023-03-25 LAB
FINAL PATHOLOGIC DIAGNOSIS: NORMAL
Lab: NORMAL

## 2023-12-14 ENCOUNTER — TELEPHONE (OUTPATIENT)
Dept: SURGERY | Facility: CLINIC | Age: 53
End: 2023-12-14
Payer: COMMERCIAL

## 2023-12-20 ENCOUNTER — OFFICE VISIT (OUTPATIENT)
Dept: INTERNAL MEDICINE | Facility: CLINIC | Age: 53
End: 2023-12-20
Payer: COMMERCIAL

## 2023-12-20 VITALS
OXYGEN SATURATION: 96 % | WEIGHT: 293 LBS | TEMPERATURE: 97 F | SYSTOLIC BLOOD PRESSURE: 110 MMHG | HEART RATE: 90 BPM | DIASTOLIC BLOOD PRESSURE: 70 MMHG | RESPIRATION RATE: 20 BRPM | HEIGHT: 65 IN | BODY MASS INDEX: 48.82 KG/M2

## 2023-12-20 DIAGNOSIS — Z11.4 SCREENING FOR HIV (HUMAN IMMUNODEFICIENCY VIRUS): ICD-10-CM

## 2023-12-20 DIAGNOSIS — Z23 IMMUNIZATION DUE: ICD-10-CM

## 2023-12-20 DIAGNOSIS — E66.01 MORBID OBESITY WITH BMI OF 50.0-59.9, ADULT: ICD-10-CM

## 2023-12-20 DIAGNOSIS — Z11.59 NEED FOR HEPATITIS C SCREENING TEST: ICD-10-CM

## 2023-12-20 DIAGNOSIS — J45.40 MODERATE PERSISTENT ASTHMA WITHOUT COMPLICATION: ICD-10-CM

## 2023-12-20 DIAGNOSIS — Z00.00 ANNUAL PHYSICAL EXAM: Primary | ICD-10-CM

## 2023-12-20 PROCEDURE — 90471 TDAP VACCINE GREATER THAN OR EQUAL TO 7YO IM: ICD-10-PCS | Mod: S$GLB,,, | Performed by: INTERNAL MEDICINE

## 2023-12-20 PROCEDURE — 99999 PR PBB SHADOW E&M-EST. PATIENT-LVL IV: ICD-10-PCS | Mod: PBBFAC,,, | Performed by: INTERNAL MEDICINE

## 2023-12-20 PROCEDURE — 90715 TDAP VACCINE GREATER THAN OR EQUAL TO 7YO IM: ICD-10-PCS | Mod: S$GLB,,, | Performed by: INTERNAL MEDICINE

## 2023-12-20 PROCEDURE — 99386 PR PREVENTIVE VISIT,NEW,40-64: ICD-10-PCS | Mod: 25,S$GLB,, | Performed by: INTERNAL MEDICINE

## 2023-12-20 PROCEDURE — 90472 IMMUNIZATION ADMIN EACH ADD: CPT | Mod: S$GLB,,, | Performed by: INTERNAL MEDICINE

## 2023-12-20 PROCEDURE — 90750 HZV VACC RECOMBINANT IM: CPT | Mod: S$GLB,,, | Performed by: INTERNAL MEDICINE

## 2023-12-20 PROCEDURE — 99386 PREV VISIT NEW AGE 40-64: CPT | Mod: 25,S$GLB,, | Performed by: INTERNAL MEDICINE

## 2023-12-20 PROCEDURE — 90750 ZOSTER RECOMBINANT VACCINE: ICD-10-PCS | Mod: S$GLB,,, | Performed by: INTERNAL MEDICINE

## 2023-12-20 PROCEDURE — 99999 PR PBB SHADOW E&M-EST. PATIENT-LVL IV: CPT | Mod: PBBFAC,,, | Performed by: INTERNAL MEDICINE

## 2023-12-20 PROCEDURE — 90471 IMMUNIZATION ADMIN: CPT | Mod: S$GLB,,, | Performed by: INTERNAL MEDICINE

## 2023-12-20 PROCEDURE — 90472 ZOSTER RECOMBINANT VACCINE: ICD-10-PCS | Mod: S$GLB,,, | Performed by: INTERNAL MEDICINE

## 2023-12-20 PROCEDURE — 90715 TDAP VACCINE 7 YRS/> IM: CPT | Mod: S$GLB,,, | Performed by: INTERNAL MEDICINE

## 2023-12-20 RX ORDER — ALBUTEROL SULFATE 90 UG/1
2 AEROSOL, METERED RESPIRATORY (INHALATION) EVERY 6 HOURS PRN
Qty: 18 G | Refills: 3 | Status: SHIPPED | OUTPATIENT
Start: 2023-12-20

## 2023-12-20 RX ORDER — FLUTICASONE PROPIONATE 220 UG/1
1 AEROSOL, METERED RESPIRATORY (INHALATION) 2 TIMES DAILY
Qty: 12 G | Refills: 11 | Status: SHIPPED | OUTPATIENT
Start: 2023-12-20 | End: 2023-12-29 | Stop reason: RX

## 2023-12-20 NOTE — PROGRESS NOTES
Jamila Bolton  53 y.o. Black or  female  [id number]    Chief Complaint:  Chief Complaint   Patient presents with    [facility]       History of Present Illness:  New patient to me.   Moved to the area from [place].   Asthma--uses Flovent inhaler daily but has been having trouble getting Flovent diskus each time due to insurance. She is also using albuterol mostly every day. She denies nighttime symptoms.   She knows her asthma would bet better if she loses weight.   She has paperwork for her employer she needs completing in order to get work place accommodations.     Review of Systems   Constitutional:  Negative for malaise/fatigue and weight loss.   HENT:  Negative for hearing loss.    Eyes:  Negative for blurred vision.   Respiratory:  Positive for shortness of breath and wheezing. Negative for cough.    Cardiovascular:  Negative for chest pain, palpitations and leg swelling.   Gastrointestinal:  Negative for abdominal pain, blood in stool, constipation and diarrhea.   Genitourinary:  Negative for dysuria.   Musculoskeletal:  Positive for back pain and joint pain.   Skin:  Negative for rash.   Neurological:  Positive for headaches. Negative for dizziness and weakness.   Psychiatric/Behavioral:  Negative for depression. The patient is nervous/anxious. The patient does not have insomnia.        The following were reviewed: Active problem list, medication list, allergies, family history, social history, and Health Maintenance.     History:  Past Medical History:   Diagnosis Date    Asthma      Past Surgical History:   Procedure Laterality Date    COLONOSCOPY N/A 06/25/2021    Procedure: COLONOSCOPY;  Surgeon: Rhett Parsons MD;  Location: [address]);  Service: Endoscopy;  Laterality: N/A;  outside referral from [facility] for colonoscopy see media tab for records  fully vaccinated see media -sm    GASTRIC BYPASS      TONSILLECTOMY  1983     Family History   Problem Relation Age  of Onset    Kidney disease Mother     Thyroid disease Father     Emphysema Father     Breast cancer Sister 45    Asthma Sister     Breast cancer Maternal Aunt     Cancer Neg Hx         colon      Social History     Socioeconomic History    Marital status: Single   Tobacco Use    Smoking status: Never    Smokeless tobacco: Never   Substance and Sexual Activity    Alcohol use: Yes     Alcohol/week: 1.0 standard drink of alcohol     Types: 1 Glasses of wine per week    Drug use: Never    Sexual activity: Not Currently     Partners: Male     Birth control/protection: Abstinence, Condom, Post-menopausal     Patient Active Problem List   Diagnosis    At high risk for breast cancer     Review of patient's allergies indicates:   Allergen Reactions    Aspirin Other (See Comments)    Sinus allergy Rash       Health Maintenance  Health Maintenance Topics with due status: Not Due       Topic Last Completion Date    Colorectal Cancer Screening 06/25/2021    Cervical Cancer Screening 03/15/2023    TETANUS VACCINE 12/20/2023    Shingles Vaccine 12/20/2023     Health Maintenance Due   Topic Date Due    Hepatitis C Screening  Never done    Lipid Panel  Never done    HIV Screening  Never done    Hemoglobin A1c (Diabetic Prevention Screening)  Never done    Influenza Vaccine (1) Never done    COVID-19 Vaccine (7 - 2023-24 season) 09/01/2023    Mammogram  02/17/2024       Medications:  Current Outpatient Medications on File Prior to Visit   Medication Sig Dispense Refill    cholecalciferol, vitamin D3, 1,250 mcg (50,000 unit) Tab Take by mouth.      loratadine (CLARITIN) 10 mg tablet Take 10 mg by mouth.       No current facility-administered medications on file prior to visit.       Medications have been reviewed and reconciled with patient at visit today.    Exam:  Vitals:    12/20/23 1453   BP: 110/70   Pulse: 90   Resp: 20   Temp: 97.1 °F (36.2 °C)     Weight: (!) 138.1 kg (304 lb 7.3 oz)   Body mass index is 50.66  kg/m².      Physical Exam  Vitals reviewed.   Constitutional:       General: She is not in acute distress.     Appearance: She is well-developed. She is obese. She is not ill-appearing.   Eyes:      General: No scleral icterus.  Cardiovascular:      Rate and Rhythm: Normal rate and regular rhythm.      Heart sounds: Normal heart sounds.   Pulmonary:      Effort: Pulmonary effort is normal. No respiratory distress.      Breath sounds: Normal breath sounds. No wheezing.   Abdominal:      General: Bowel sounds are normal.      Palpations: Abdomen is soft.   Skin:     General: Skin is warm and dry.   Neurological:      Mental Status: She is alert and oriented to person, place, and time.   Psychiatric:         Behavior: Behavior normal.       Assessment:  The primary encounter diagnosis was Annual physical exam. Diagnoses of Moderate persistent asthma without complication, Morbid obesity with BMI of 50.0-59.9, adult, Screening for HIV (human immunodeficiency virus), Need for hepatitis C screening test, and Immunization due were also pertinent to this visit.    Plan:  Annual physical exam  -     Counseled regarding age appropriate screenings and immunizations  -     Counseled regarding lifestyle modifications  -     Lipid Panel; Future; Expected date: 06/19/2024  -     Hemoglobin A1C; Future; Expected date: 03/19/2024  -     Comprehensive Metabolic Panel; Future; Expected date: 12/20/2023  -     TSH; Future  -     CBC Auto Differential; Future; Expected date: 12/20/2023  -     Vitamin D; Future; Expected date: 12/20/2023    Moderate persistent asthma without complication  -     luticasone propionate (FLOVENT HFA) 220 mcg/actuation inhaler; Inhale 1 puff into the lungs 2 (two) times daily.  Dispense: 12 g; Refill: 11  -     refill albuterol (PROVENTIL/VENTOLIN HFA) 90 mcg/actuation inhaler; Inhale 2 puffs into the lungs every 6 (six) hours as needed for Wheezing or Shortness of Breath.  Dispense: 18 g; Refill: 3    Morbid  obesity with BMI of 50.0-59.9, adult  -     Lifestyle modifications discussed    Screening for HIV (human immunodeficiency virus)  -     HIV 1/2 Ag/Ab (4th Gen); Future; Expected date: 12/27/2023    Need for hepatitis C screening test  -     HEPATITIS C ANTIBODY; Future; Expected date: 12/27/2023    Immunization due  -     (In Office Administered) Tdap Vaccine  -     (In Office Administered) Zoster Recombinant Vaccine    Schedule labs.

## 2023-12-22 ENCOUNTER — TELEPHONE (OUTPATIENT)
Dept: INTERNAL MEDICINE | Facility: CLINIC | Age: 53
End: 2023-12-22
Payer: COMMERCIAL

## 2023-12-22 ENCOUNTER — LAB VISIT (OUTPATIENT)
Dept: LAB | Facility: HOSPITAL | Age: 53
End: 2023-12-22
Attending: INTERNAL MEDICINE
Payer: COMMERCIAL

## 2023-12-22 DIAGNOSIS — Z11.4 SCREENING FOR HIV (HUMAN IMMUNODEFICIENCY VIRUS): ICD-10-CM

## 2023-12-22 DIAGNOSIS — Z00.00 ANNUAL PHYSICAL EXAM: ICD-10-CM

## 2023-12-22 DIAGNOSIS — Z11.59 NEED FOR HEPATITIS C SCREENING TEST: ICD-10-CM

## 2023-12-22 LAB
25(OH)D3+25(OH)D2 SERPL-MCNC: 71 NG/ML (ref 30–96)
ALBUMIN SERPL BCP-MCNC: 3.7 G/DL (ref 3.5–5.2)
ALP SERPL-CCNC: 118 U/L (ref 55–135)
ALT SERPL W/O P-5'-P-CCNC: 15 U/L (ref 10–44)
ANION GAP SERPL CALC-SCNC: 8 MMOL/L (ref 8–16)
AST SERPL-CCNC: 21 U/L (ref 10–40)
BASOPHILS # BLD AUTO: 0.05 K/UL (ref 0–0.2)
BASOPHILS NFR BLD: 0.7 % (ref 0–1.9)
BILIRUB SERPL-MCNC: 1.5 MG/DL (ref 0.1–1)
BUN SERPL-MCNC: 12 MG/DL (ref 6–20)
CALCIUM SERPL-MCNC: 9.8 MG/DL (ref 8.7–10.5)
CHLORIDE SERPL-SCNC: 108 MMOL/L (ref 95–110)
CHOLEST SERPL-MCNC: 169 MG/DL (ref 120–199)
CHOLEST/HDLC SERPL: 2.2 {RATIO} (ref 2–5)
CO2 SERPL-SCNC: 26 MMOL/L (ref 23–29)
CREAT SERPL-MCNC: 0.9 MG/DL (ref 0.5–1.4)
DIFFERENTIAL METHOD: ABNORMAL
EOSINOPHIL # BLD AUTO: 0.3 K/UL (ref 0–0.5)
EOSINOPHIL NFR BLD: 3.7 % (ref 0–8)
ERYTHROCYTE [DISTWIDTH] IN BLOOD BY AUTOMATED COUNT: 14.6 % (ref 11.5–14.5)
EST. GFR  (NO RACE VARIABLE): >60 ML/MIN/1.73 M^2
ESTIMATED AVG GLUCOSE: 111 MG/DL (ref 68–131)
GLUCOSE SERPL-MCNC: 82 MG/DL (ref 70–110)
HBA1C MFR BLD: 5.5 % (ref 4–5.6)
HCT VFR BLD AUTO: 37.6 % (ref 37–48.5)
HCV AB SERPL QL IA: NORMAL
HDLC SERPL-MCNC: 78 MG/DL (ref 40–75)
HDLC SERPL: 46.2 % (ref 20–50)
HGB BLD-MCNC: 11.9 G/DL (ref 12–16)
HIV 1+2 AB+HIV1 P24 AG SERPL QL IA: NORMAL
IMM GRANULOCYTES # BLD AUTO: 0.02 K/UL (ref 0–0.04)
IMM GRANULOCYTES NFR BLD AUTO: 0.3 % (ref 0–0.5)
LDLC SERPL CALC-MCNC: 83.6 MG/DL (ref 63–159)
LYMPHOCYTES # BLD AUTO: 1.4 K/UL (ref 1–4.8)
LYMPHOCYTES NFR BLD: 18.6 % (ref 18–48)
MCH RBC QN AUTO: 28.5 PG (ref 27–31)
MCHC RBC AUTO-ENTMCNC: 31.6 G/DL (ref 32–36)
MCV RBC AUTO: 90 FL (ref 82–98)
MONOCYTES # BLD AUTO: 0.9 K/UL (ref 0.3–1)
MONOCYTES NFR BLD: 11.9 % (ref 4–15)
NEUTROPHILS # BLD AUTO: 4.7 K/UL (ref 1.8–7.7)
NEUTROPHILS NFR BLD: 64.8 % (ref 38–73)
NONHDLC SERPL-MCNC: 91 MG/DL
NRBC BLD-RTO: 0 /100 WBC
PLATELET # BLD AUTO: 223 K/UL (ref 150–450)
PMV BLD AUTO: 12.4 FL (ref 9.2–12.9)
POTASSIUM SERPL-SCNC: 4.2 MMOL/L (ref 3.5–5.1)
PROT SERPL-MCNC: 6.9 G/DL (ref 6–8.4)
RBC # BLD AUTO: 4.18 M/UL (ref 4–5.4)
SODIUM SERPL-SCNC: 142 MMOL/L (ref 136–145)
TRIGL SERPL-MCNC: 37 MG/DL (ref 30–150)
TSH SERPL DL<=0.005 MIU/L-ACNC: 0.89 UIU/ML (ref 0.4–4)
WBC # BLD AUTO: 7.31 K/UL (ref 3.9–12.7)

## 2023-12-22 PROCEDURE — 36415 COLL VENOUS BLD VENIPUNCTURE: CPT | Performed by: INTERNAL MEDICINE

## 2023-12-22 PROCEDURE — 87389 HIV-1 AG W/HIV-1&-2 AB AG IA: CPT | Performed by: INTERNAL MEDICINE

## 2023-12-22 PROCEDURE — 84443 ASSAY THYROID STIM HORMONE: CPT | Performed by: INTERNAL MEDICINE

## 2023-12-22 PROCEDURE — 85025 COMPLETE CBC W/AUTO DIFF WBC: CPT | Performed by: INTERNAL MEDICINE

## 2023-12-22 PROCEDURE — 80061 LIPID PANEL: CPT | Performed by: INTERNAL MEDICINE

## 2023-12-22 PROCEDURE — 83036 HEMOGLOBIN GLYCOSYLATED A1C: CPT | Performed by: INTERNAL MEDICINE

## 2023-12-22 PROCEDURE — 82306 VITAMIN D 25 HYDROXY: CPT | Performed by: INTERNAL MEDICINE

## 2023-12-22 PROCEDURE — 86803 HEPATITIS C AB TEST: CPT | Performed by: INTERNAL MEDICINE

## 2023-12-22 PROCEDURE — 80053 COMPREHEN METABOLIC PANEL: CPT | Performed by: INTERNAL MEDICINE

## 2023-12-26 ENCOUNTER — TELEPHONE (OUTPATIENT)
Dept: INTERNAL MEDICINE | Facility: CLINIC | Age: 53
End: 2023-12-26
Payer: COMMERCIAL

## 2023-12-26 NOTE — TELEPHONE ENCOUNTER
Okay to take ibuprofen or Tylenol. Apply ice if red/swollen. If no improvement, she should get evaluated.

## 2023-12-26 NOTE — TELEPHONE ENCOUNTER
----- Message from Colleen Zavala sent at 12/26/2023  1:30 PM CST -----  Contact: Patient, [phone]  Calling to speak with the nurse. Please call her. Thanks.

## 2023-12-26 NOTE — TELEPHONE ENCOUNTER
----- Message from Colleen Zavala sent at 12/26/2023  1:30 PM CST -----  Contact: Patient, [phone]  Calling to speak with the nurse. Please call her. Thanks.

## 2023-12-27 ENCOUNTER — OFFICE VISIT (OUTPATIENT)
Dept: INTERNAL MEDICINE | Facility: CLINIC | Age: 53
End: 2023-12-27
Payer: COMMERCIAL

## 2023-12-27 VITALS
SYSTOLIC BLOOD PRESSURE: 136 MMHG | WEIGHT: 293 LBS | OXYGEN SATURATION: 97 % | BODY MASS INDEX: 48.82 KG/M2 | HEIGHT: 65 IN | DIASTOLIC BLOOD PRESSURE: 80 MMHG | HEART RATE: 69 BPM

## 2023-12-27 DIAGNOSIS — J10.1 INFLUENZA A: ICD-10-CM

## 2023-12-27 DIAGNOSIS — Z20.828 EXPOSURE TO THE FLU: ICD-10-CM

## 2023-12-27 DIAGNOSIS — J06.9 UPPER RESPIRATORY TRACT INFECTION, UNSPECIFIED TYPE: ICD-10-CM

## 2023-12-27 DIAGNOSIS — J45.41 ACUTE EXACERBATION OF MODERATE PERSISTENT EXTRINSIC ASTHMA: Primary | ICD-10-CM

## 2023-12-27 LAB
CTP QC/QA: YES
CTP QC/QA: YES
POC MOLECULAR INFLUENZA A AGN: POSITIVE
POC MOLECULAR INFLUENZA B AGN: NEGATIVE
SARS-COV-2 RDRP RESP QL NAA+PROBE: NEGATIVE

## 2023-12-27 PROCEDURE — 99999 PR PBB SHADOW E&M-EST. PATIENT-LVL III: CPT | Mod: PBBFAC,,, | Performed by: INTERNAL MEDICINE

## 2023-12-27 PROCEDURE — 99214 OFFICE O/P EST MOD 30 MIN: CPT | Mod: S$GLB,,, | Performed by: INTERNAL MEDICINE

## 2023-12-27 PROCEDURE — 87635: ICD-10-PCS | Mod: QW,S$GLB,, | Performed by: INTERNAL MEDICINE

## 2023-12-27 PROCEDURE — 87502 POCT INFLUENZA A/B MOLECULAR: ICD-10-PCS | Mod: QW,S$GLB,, | Performed by: INTERNAL MEDICINE

## 2023-12-27 PROCEDURE — 99999 PR PBB SHADOW E&M-EST. PATIENT-LVL III: ICD-10-PCS | Mod: PBBFAC,,, | Performed by: INTERNAL MEDICINE

## 2023-12-27 PROCEDURE — 87502 INFLUENZA DNA AMP PROBE: CPT | Mod: QW,S$GLB,, | Performed by: INTERNAL MEDICINE

## 2023-12-27 PROCEDURE — 87635 SARS-COV-2 COVID-19 AMP PRB: CPT | Mod: QW,S$GLB,, | Performed by: INTERNAL MEDICINE

## 2023-12-27 PROCEDURE — 99214 PR OFFICE/OUTPT VISIT, EST, LEVL IV, 30-39 MIN: ICD-10-PCS | Mod: S$GLB,,, | Performed by: INTERNAL MEDICINE

## 2023-12-27 RX ORDER — BENZONATATE 100 MG/1
100 CAPSULE ORAL 3 TIMES DAILY PRN
Qty: 30 CAPSULE | Refills: 0 | Status: SHIPPED | OUTPATIENT
Start: 2023-12-27 | End: 2024-01-06

## 2023-12-27 RX ORDER — AZITHROMYCIN 250 MG/1
TABLET, FILM COATED ORAL
Qty: 6 TABLET | Refills: 0 | Status: SHIPPED | OUTPATIENT
Start: 2023-12-27 | End: 2024-01-01

## 2023-12-27 RX ORDER — METHYLPREDNISOLONE 4 MG/1
TABLET ORAL
Qty: 21 EACH | Refills: 0 | Status: SHIPPED | OUTPATIENT
Start: 2023-12-27 | End: 2024-01-17

## 2023-12-27 NOTE — PATIENT INSTRUCTIONS
Medrol dose pack  - follow directions on packaging  Azithromycin - follow directions on packaging    Tessalon perles as needed for cough    Return to clinic as needed.

## 2023-12-27 NOTE — PROGRESS NOTES
Subjective:       Patient ID: Jamila Bolton is a 53 y.o. female.    Chief Complaint: Influenza      HPI  Jamila Bolton is a 53 y.o. year old female with history of moderate persistent asthma, allergic rhinitis, vitamin D deficiency presents for urgent care visit for flu like symptoms.     Symptoms starting Saturday  Fever, chills, sweating, nasal congestion, provoking asthma.     Was able to fill albuterol rescue already.     Thinks she may have gotten it from work the week prior to sim break.   Brother tested positive for influenza yesterday.     Review of Systems   Constitutional:  Positive for chills, fatigue and fever.   HENT:  Positive for congestion and rhinorrhea. Negative for sore throat.    Respiratory:  Positive for cough and wheezing. Negative for shortness of breath.    Musculoskeletal:  Negative for arthralgias, back pain and myalgias.   Neurological:  Positive for headaches. Negative for dizziness and light-headedness.   Psychiatric/Behavioral:  Negative for dysphoric mood.          Past Medical History:   Diagnosis Date    Asthma         Prior to Admission medications    Medication Sig Start Date End Date Taking? Authorizing Provider   albuterol (PROVENTIL/VENTOLIN HFA) 90 mcg/actuation inhaler Inhale 2 puffs into the lungs every 6 (six) hours as needed for Wheezing or Shortness of Breath. 12/20/23  Yes Skylar Apodaca, DO   cholecalciferol, vitamin D3, 1,250 mcg (50,000 unit) Tab Take by mouth.   Yes Provider, Historical   fluticasone propionate (FLOVENT HFA) 220 mcg/actuation inhaler Inhale 1 puff into the lungs 2 (two) times daily. 12/20/23  Yes Skylar Apodaca,    loratadine (CLARITIN) 10 mg tablet Take 10 mg by mouth. 2/22/23  Yes Provider, Historical        Past medical history, surgical history, and family medical history reviewed and updated as appropriate.    Medications and allergies reviewed.     Objective:          Vitals:    12/27/23 1313   BP: 136/80   BP Location: Right arm  "  Patient Position: Sitting   Pulse: 69   SpO2: 97%   Weight: 136 kg (299 lb 13.2 oz)   Height: 5' 5" (1.651 m)     Body mass index is 49.89 kg/m².  Physical Exam  Constitutional:       Appearance: She is well-developed.   HENT:      Head: Normocephalic and atraumatic.   Eyes:      Extraocular Movements: Extraocular movements intact.   Cardiovascular:      Rate and Rhythm: Normal rate and regular rhythm.      Heart sounds: Normal heart sounds.   Pulmonary:      Effort: Pulmonary effort is normal. No respiratory distress.      Breath sounds: Rhonchi present. No wheezing.      Comments: Coarse breath sounds  Inspiratory squeek on mid posterior lung field right side  Abdominal:      General: There is no distension.      Palpations: Abdomen is soft.      Tenderness: There is no abdominal tenderness.   Musculoskeletal:         General: No tenderness. Normal range of motion.      Cervical back: Normal range of motion.   Skin:     General: Skin is warm and dry.   Neurological:      Mental Status: She is alert and oriented to person, place, and time.      Cranial Nerves: No cranial nerve deficit.      Deep Tendon Reflexes: Reflexes are normal and symmetric.         Lab Results   Component Value Date    WBC 7.31 12/22/2023    HGB 11.9 (L) 12/22/2023    HCT 37.6 12/22/2023     12/22/2023    CHOL 169 12/22/2023    TRIG 37 12/22/2023    HDL 78 (H) 12/22/2023    ALT 15 12/22/2023    AST 21 12/22/2023     12/22/2023    K 4.2 12/22/2023     12/22/2023    CREATININE 0.9 12/22/2023    BUN 12 12/22/2023    CO2 26 12/22/2023    TSH 0.887 12/22/2023    HGBA1C 5.5 12/22/2023       Assessment:       1. Acute exacerbation of moderate persistent extrinsic asthma    2. Influenza A    3. Upper respiratory tract infection, unspecified type    4. Exposure to the flu          Plan:     Jamila was seen today for influenza.    Diagnoses and all orders for this visit:    Acute exacerbation of moderate persistent extrinsic " asthma  Comments:  start medrol dose pack and azithromycin for anti-inflammatory effects.  Orders:  -     azithromycin (Z-DENYS) 250 MG tablet; Take 2 tablets by mouth on day 1; Take 1 tablet by mouth on days 2-5  -     methylPREDNISolone (MEDROL DOSEPACK) 4 mg tablet; use as directed  -     benzonatate (TESSALON) 100 MG capsule; Take 1 capsule (100 mg total) by mouth 3 (three) times daily as needed for Cough.    Influenza A  Comments:  symptoms started >4 days ago, outside of treatment window for oseltamivir    Upper respiratory tract infection, unspecified type  Comments:  presents for 4 day history of URI symptoms, provoking asthma exacerbation. has been using rescue inhaler every 4 hours as needed    Exposure to the flu  Comments:  brother and niece were sick at [facility]        Health maintenance reviewed with patient.     Follow up if symptoms worsen or fail to improve.    Sudhakar Zavala MD  Internal Medicine / Primary Care  [facility]  12/27/2023

## 2023-12-28 ENCOUNTER — OFFICE VISIT (OUTPATIENT)
Dept: INTERNAL MEDICINE | Facility: CLINIC | Age: 53
End: 2023-12-28
Payer: COMMERCIAL

## 2023-12-28 DIAGNOSIS — Z02.89 ENCOUNTER FOR COMPLETION OF FORM WITH PATIENT: Primary | ICD-10-CM

## 2023-12-28 DIAGNOSIS — M54.50 CHRONIC MIDLINE LOW BACK PAIN WITHOUT SCIATICA: ICD-10-CM

## 2023-12-28 DIAGNOSIS — G89.29 CHRONIC MIDLINE LOW BACK PAIN WITHOUT SCIATICA: ICD-10-CM

## 2023-12-28 PROCEDURE — 99213 OFFICE O/P EST LOW 20 MIN: CPT | Mod: 95,,, | Performed by: INTERNAL MEDICINE

## 2023-12-28 PROCEDURE — 99213 PR OFFICE/OUTPT VISIT, EST, LEVL III, 20-29 MIN: ICD-10-PCS | Mod: 95,,, | Performed by: INTERNAL MEDICINE

## 2023-12-28 NOTE — PROGRESS NOTES
Subjective     Patient ID: Jamila Bolton is a 53 y.o. female.    Chief Complaint: Paperwork completion    The patient location is: [place]   The chief complaint leading to consultation is: form completion    Visit type: audiovisual    Face to Face time with patient: 13 minutes   13 minutes of total time spent on the encounter, which includes face to face time and non-face to face time preparing to see the patient (eg, review of tests), Obtaining and/or reviewing separately obtained history, Documenting clinical information in the electronic or other health record, Independently interpreting results (not separately reported) and communicating results to the patient/family/caregiver, or Care coordination (not separately reported).         Each patient to whom he or she provides medical services by telemedicine is:  (1) informed of the relationship between the physician and patient and the respective role of any other health care provider with respect to management of the patient; and (2) notified that he or she may decline to receive medical services by telemedicine and may withdraw from such care at any time.    Notes:   Needs paperwork completed for work place accommodation. She has chronic midline low back pain and her pain is worse with prolonged sitting. Her job requires prolonged sitting. She is requesting an ergonomic chair.       Review of Systems   Constitutional:  Positive for activity change. Negative for unexpected weight change.   HENT:  Positive for rhinorrhea. Negative for hearing loss and trouble swallowing.    Eyes:  Negative for discharge and visual disturbance.   Respiratory:  Positive for wheezing. Negative for chest tightness.    Cardiovascular:  Negative for chest pain and palpitations.   Gastrointestinal:  Negative for blood in stool, constipation, diarrhea and vomiting.   Endocrine: Negative for polydipsia and polyuria.   Genitourinary:  Negative for difficulty urinating, dysuria, hematuria  and menstrual problem.   Musculoskeletal:  Positive for back pain (chronic; midline). Negative for arthralgias, joint swelling and neck pain.   Neurological:  Negative for weakness, numbness and headaches.   Psychiatric/Behavioral:  Negative for confusion and dysphoric mood.           Objective     Physical Exam  Constitutional:       General: She is not in acute distress.     Appearance: She is well-developed. She is not ill-appearing.   Pulmonary:      Effort: Pulmonary effort is normal. No respiratory distress.   Musculoskeletal:      Lumbar back: Decreased range of motion.   Neurological:      Mental Status: She is alert and oriented to person, place, and time.   Psychiatric:         Behavior: Behavior normal.         Thought Content: Thought content normal.         Judgment: Judgment normal.       Jamila was seen today for paperwork completion.    Diagnoses and all orders for this visit:    Encounter for completion of form with patient    Chronic midline low back pain without sciatica    Form completed. She will come to the clinic to pick it up.

## 2023-12-29 ENCOUNTER — TELEPHONE (OUTPATIENT)
Dept: INTERNAL MEDICINE | Facility: CLINIC | Age: 53
End: 2023-12-29
Payer: COMMERCIAL

## 2023-12-29 DIAGNOSIS — J45.40 MODERATE PERSISTENT ASTHMA WITHOUT COMPLICATION: ICD-10-CM

## 2023-12-29 RX ORDER — BUDESONIDE AND FORMOTEROL FUMARATE DIHYDRATE 80; 4.5 UG/1; UG/1
2 AEROSOL RESPIRATORY (INHALATION) 2 TIMES DAILY
Qty: 10.2 G | Refills: 2 | Status: SHIPPED | OUTPATIENT
Start: 2023-12-29 | End: 2023-12-29 | Stop reason: SDUPTHER

## 2023-12-29 RX ORDER — BUDESONIDE AND FORMOTEROL FUMARATE DIHYDRATE 80; 4.5 UG/1; UG/1
2 AEROSOL RESPIRATORY (INHALATION) 2 TIMES DAILY
Qty: 10.2 G | Refills: 2 | Status: SHIPPED | OUTPATIENT
Start: 2023-12-29

## 2023-12-29 NOTE — TELEPHONE ENCOUNTER
Patient states pharmacy is discontinuing Flovent inhaler and need something new called out. Patient is currently out with the flu and is using her inhaler more and needs Rx.

## 2023-12-29 NOTE — TELEPHONE ENCOUNTER
Returned call to patient about transferring medication to a different pharmacy than where it was sent. Advised pt that she can go to the pharmacy and request for it to be transferred. Pt verbalized understanding.

## 2023-12-29 NOTE — TELEPHONE ENCOUNTER
Prescription sent to pharmacy for Symbicort. If there is an issue with this medication, I suggest she check with her insurance to see what is covered.

## 2023-12-29 NOTE — TELEPHONE ENCOUNTER
----- Message from Leeanna Garcia sent at 12/29/2023  1:36 PM CST -----  Patient missed a call from [facility] concerning a prescription. She is requesting a call back at [phone]

## 2024-01-05 ENCOUNTER — TELEPHONE (OUTPATIENT)
Dept: INTERNAL MEDICINE | Facility: CLINIC | Age: 54
End: 2024-01-05
Payer: COMMERCIAL

## 2024-01-05 NOTE — TELEPHONE ENCOUNTER
----- Message from Arelis Esquivel sent at 1/5/2024  9:50 AM CST -----  Patient os calling requesting that a cough syrup be called in to the [facility] at [address] in [place], [place]. The phone number is [phone]. Please call patient to confirm at [phone]     Patient states that she can not get rid of the cough.

## 2024-01-05 NOTE — TELEPHONE ENCOUNTER
Patient states she had the flu and is still coughing. She would like to know if something else can be called in for her cough? Will patient need to complete an e visit first?

## 2024-01-23 NOTE — TELEPHONE ENCOUNTER
----- Message from Elías Loyola sent at 1/23/2024 11:44 AM CST -----  Contact: el  Patient stated that she needs a refill vitamin D and she also needs orders for her yearly mammo. Please call her back at [phone].          [facility]/pharmacy #[id number] - [place] - [address]  [address]  [place] [address]  Phone: [phone] Fax: [phone]      Thanks  DD

## 2024-01-24 RX ORDER — CHOLECALCIFEROL (VITAMIN D3) 1250 MCG
1 TABLET ORAL WEEKLY
Qty: 4 TABLET | Refills: 11 | Status: SHIPPED | OUTPATIENT
Start: 2024-01-24

## 2024-02-01 ENCOUNTER — PATIENT MESSAGE (OUTPATIENT)
Dept: INTERNAL MEDICINE | Facility: CLINIC | Age: 54
End: 2024-02-01
Payer: COMMERCIAL

## 2024-04-21 ENCOUNTER — PATIENT MESSAGE (OUTPATIENT)
Dept: HEMATOLOGY/ONCOLOGY | Facility: CLINIC | Age: 54
End: 2024-04-21
Payer: COMMERCIAL

## 2024-05-07 NOTE — PROGRESS NOTES
"Reason For Consultation:   Increased lifetime risk of breast cancer      Records Obtained: Records of the patients history including those obtained from the referring provider were reviewed and summarized in detail.    HPI:   Jamila Bolton presents for follow up for increased risk of breast cancer. She is postmenopausal. She presented for screening mammogram on 23 which was benign but revealed a Tyrer-Cuzick score of 25.01%.       She moved up to [place] to be closer to her sisters. She has been working for [employer].  Today, Feels good and no complaints.   No breast concerns. Denies nipple discharge and skin changes.   Denies changes to family history. She is reestablishing with a GYN in [place].   Denies smoking. Occasional wine.   She has not been exercising     High Risk Breast cancer specific history:  - Age: 53 y.o.   - Height:  5'5"  - Weight: 134.9 kg  - Breast density per BI-RADS: Scattered Fibroglandular Density     - Age at menarche:  8  - Number of pregnancies: ;   - Age at menopause, if applicable: 48.   -Uterus and ovaries intact: Yes  - HRT: No    - Genetic testing: No; Done with Dr. Rosales   - Personal history of cancer: No  - Previous chest radiation exposure between ages 10-30 years old: No  - Personal history of breast biopsy: No  - Ashkenazi Jew Inheritance: No  - Family history of cancer:     Sister Breast Cancer diagnosed age 45; alive 57 - DCIS treated with Dr. Lind in [place]; María Crow    Maternal Aunt Breast Cancer diagnosed in her 40's?    Paternal Grandfather prostate cancer    Social History:  Tobacco use:  None  Alcohol use:  Occasionally - Wine  Exercise regimen: Moves around at work when she can  Employment: payroll at [employer]    SEE CALCULATED RISK BELOW.     Past Medical   Past Medical History:   Diagnosis Date    Asthma      Patient Active Problem List   Diagnosis    At high risk for breast cancer     Social History   Social History     Tobacco Use    " Smoking status: Never    Smokeless tobacco: Never   Substance Use Topics    Alcohol use: Yes     Alcohol/week: 1.0 standard drink of alcohol     Types: 1 Glasses of wine per week    Drug use: Never     Family History  Family History   Problem Relation Name Age of Onset    Kidney disease Mother      Thyroid disease Father      Emphysema Father      Breast cancer Sister Pao Noble 45    Asthma Sister Pao Noble     Breast cancer Maternal Aunt      Cancer Neg Hx          colon      Medications    Current Outpatient Medications:     albuterol (PROVENTIL/VENTOLIN HFA) 90 mcg/actuation inhaler, Inhale 2 puffs into the lungs every 6 (six) hours as needed for Wheezing or Shortness of Breath., Disp: 18 g, Rfl: 3    budesonide-formoterol 80-4.5 mcg (SYMBICORT) 80-4.5 mcg/actuation HFAA, Inhale 2 puffs into the lungs 2 (two) times a day., Disp: 10.2 g, Rfl: 2    cholecalciferol, vitamin D3, 1,250 mcg (50,000 unit) Tab, Take 1 tablet by mouth once a week., Disp: 4 tablet, Rfl: 11    loratadine (CLARITIN) 10 mg tablet, Take 10 mg by mouth., Disp: , Rfl:   Allergies  Review of patient's allergies indicates:   Allergen Reactions    Aspirin Other (See Comments)    Sinus allergy Rash       Review of Systems       See above   Review of Systems  Review of Systems  All other systems reviewed and are negative.    Objective:      Vitals:   There were no vitals filed for this visit.    BMI: There is no height or weight on file to calculate BMI.   There is no height or weight on file to calculate BSA.    Physical Exam  Constitutional:       Appearance: Normal appearance. She is obese.   HENT:      Head: Normocephalic and atraumatic.      Nose: Nose normal.   Pulmonary:      Effort: Pulmonary effort is normal.   Neurological:      General: No focal deficit present.      Mental Status: She is alert and oriented to person, place, and time.   Psychiatric:         Mood and Affect: Mood normal.         Behavior: Behavior normal.          Thought Content: Thought content normal.         Judgment: Judgment normal.     Limited due to virtual visit      Laboratory Data: reviewed most recent   Imaging: reviewed most recent        Assessment:     1. At high risk for breast cancer        1. Increased risk of breast cancer   * Tyrer-Cuzick (TC) lifetime risk of 27.6%. We discussed that TC score will categorize your lifetime risk of being diagnosed with breast cancer. Categories are as such: Average risk <15%, Intermediate risk 15-19%, and High risk > or = to 20%.    * The Thea Model for Breast Cancer risk: She has a 2.1% 5-year breast cancer risk (Compared with 1.4% for the average 55 y.o. woman) and 12.5% Lifetime breast cancer risk (Compared with 8.2% for the average 55 y.o. woman). A woman's risk is considered low if her five-year risk of developing breast cancer is less than 1.6%; it is considered high if she scores above 1.66%. (All women who are over 60 have a score of at least 1.66 and are considered high risk, based on the Thea Model.)      Risk factors are categorized into 2 groups: Modifiable and Non-modifiable. Modifiable risk factors include use of hormones, alcohol, smoking, diet and exercise. Non-modifiable risk factors include breast density, genetics, chest radiation, previous pregnancies, age of first period, and age of menopause.    * For women at high risk for breast cancer, endocrine therapy can reduce the risk of invasive and/or in situ breast cancers. (tamoxifen for premenopausal or postmenopausal women and raloxifene or exemestane for postmenopausal women).   * Discussed that Tamoxifen 20 mg daily for 5 years has shown to reduce risk of breast cancer by 49% and women with ADH/ALH or LCIS have an even more significant reduction of risk of 86%. Aromatase inhibitors for 5 years have also shown risk reduction in terms of 50-60%. At current, there is not adequate data to recommend longer courses of therapy more than 5 years for risk  reduction.    * Tamoxifen has limited data in BRCA 1/2 mutation carriers but limited retrospective data is suggestive of benefit. There is retrospective data that aromatase inhibitors can reduce the risk of contralateral ER positive breast cancers in BRCA 1/2 patients who were taking AIs as adjuvant therapy. There is no data for raloxifen in the population.    * Reviewed risks of Tamoxifen side effects include hot flashes, invasive endometrial cancer in women > 49 years of age (2.3/1000 compared to 0.9/1000), cataracts, increased risk of pulmonary embolism among others.  * Reviewed Lifestyle modifications which have shown benefit:  Limit alcohol consumption to less than 1 drink per day (1 ounce liquor, 6 oz wine, 8 oz beer)  Avoid smoking.  Exercise at least 150 minutes per week of moderate intensity aerobic activity or at least 75 minutes of vigorous activity. Exercise can lower the relative risk of breast cancer by ~18-20%.  Maintain healthy weight and avoid post-menopausal weight gain. Avoid processed foods and eat more lean proteins, fruits and vegetables.                * Discussed available resources including genetic counseling, nutrition, weight management.    * Reviewed future screening:   Semiannual (every 6 months) CBE (clinical breast exam).   Annual Breast MRI alternating with an annual MMG. if TC 20% or greater.                   Discussed with patient that there are several models available for stratifying breast cancer risk, and Tyrer-Cuzick is presently the model utilized by [facility] and is a model recommended per current NCCN guidelines.    The Thea Model for Breast Cancer risk estimates the absolute 5 year risk and lifetime risk of developing breast cancer. Family history includes only first degree relatives with breast cancer, which is not enough information to estimate the risk of a patient having BRCA mutation. It also underestimates the cancer risk for patients with extensive  family history. The Thea Model is a good predictor of risk for populations but not for individuals. It adjusts risk for race/ethnicity. It may underestimate breast cancer risk in patients with atypical hyperplasia and strong family history. The Thea Model was NOT designed to estimate risk for: Women with a prior diagnosis of breast cancer, lobular carcinoma in situ (LCIS), or ductal carcinoma in situ (DCIS);  Women who have received previous radiation therapy to the chest for treatment of Hodgkin lymphoma;  Women with gene mutations in BRCA1 or BRCA2, or those who are known to have certain genetic syndromes that increase risk for breast cancer; Women of age <35 or >85.    We discussed that there are limitations to every model for risk assessment, particularly that TC can overestimate risk in women with atypical hyperplasia and dense breasts and that Thea underestimates risk for those with a strong family history of breast or ovarian cancers as well as non-white women with atypical hyperplasia which can make them appear to not be candidates for risk reducing therapies.       Plan:     Patient has opted out chemoprevention but will call in the future if she wishes to pursue.   Patient elects to proceed with alternating annual mammogram and annual breast MRI along with semiannual CBEs.  Patient will follow up with PCP or GYN for one semiannual CBE along with annual mammogram in May 2025 and will RTC here for one semiannual CBE along with annual breast MRI in Nov 2024. She ihas moved to [place], will get her set up there for follow up.   Lifestyle modifications as detailed above.   5.   Encouraged breast awareness, including monthly breast self-exams.   6.   Genetic testing done with Dr. Pittman (GYN) at [facility] in 1 year to see me either at [facility] or on 3rd floor, unless established in [place].  Message sent to Kemi Hare at [facility] [place] to establish care with her    Questions  were encouraged and answered to patient's satisfaction, and patient verbalized understanding of information and agreement with the plan. Advised patient to RTC with any interval changes or concerns.        Patient is in agreement with the proposed treatment plan. All questions were answered to the patient's satisfaction. Patient knows to call clinic for any new or worsening symptoms and if anything is needed before the next clinic visit.          Flavia Pinzon, ELLYNP-C  Hematology & Medical Oncology   [address] [place] [address]  ph. [phone]  Fax. [phone]    Collaborating physician, Dr. Moseley.    Total time spent with the patient: 15 minutes, 8 minutes of face to face consultation and  7 minutes of chart review and coordination of care, on the day of the visit. This includes face to face time and non-face to face time preparing to see the patient (eg, review of tests), obtaining and/or reviewing separately obtained history, documenting clinical information in the electronic or other health record, independently interpreting resultsand communicating results to the patient/family/caregiver, or care coordination.    Route Chart for Scheduling    Med Onc Chart Routing      Follow up with physician    Follow up with DEBBIE No follow up needed. she has moved to [place]   Infusion scheduling note    Injection scheduling note    Labs    Imaging    Pharmacy appointment    Other referrals                The patient location is: her home  The chief complaint leading to consultation is: high risk for breast cancer    Visit type: audiovisual    Face to Face time with patient: 8  15 minutes of total time spent on the encounter, which includes face to face time and non-face to face time preparing to see the patient (eg, review of tests), Obtaining and/or reviewing separately obtained history, Documenting clinical information in the electronic or other health record, Independently interpreting results  (not separately reported) and communicating results to the patient/family/caregiver, or Care coordination (not separately reported).         Each patient to whom he or she provides medical services by telemedicine is:  (1) informed of the relationship between the physician and patient and the respective role of any other health care provider with respect to management of the patient; and (2) notified that he or she may decline to receive medical services by telemedicine and may withdraw from such care at any time.    Notes: see above

## 2024-05-16 ENCOUNTER — HOSPITAL ENCOUNTER (OUTPATIENT)
Dept: RADIOLOGY | Facility: HOSPITAL | Age: 54
Discharge: HOME | End: 2024-05-16
Attending: INTERNAL MEDICINE
Payer: COMMERCIAL

## 2024-05-16 DIAGNOSIS — Z12.31 ENCOUNTER FOR SCREENING MAMMOGRAM FOR BREAST CANCER: ICD-10-CM

## 2024-05-16 PROCEDURE — 77067 SCR MAMMO BI INCL CAD: CPT | Mod: TC

## 2024-05-16 PROCEDURE — 77067 SCR MAMMO BI INCL CAD: CPT | Mod: 26,,, | Performed by: RADIOLOGY

## 2024-05-16 PROCEDURE — 77063 BREAST TOMOSYNTHESIS BI: CPT | Mod: 26,,, | Performed by: RADIOLOGY

## 2024-05-21 ENCOUNTER — OFFICE VISIT (OUTPATIENT)
Dept: HEMATOLOGY/ONCOLOGY | Facility: CLINIC | Age: 54
End: 2024-05-21
Payer: COMMERCIAL

## 2024-05-21 DIAGNOSIS — Z91.89 AT HIGH RISK FOR BREAST CANCER: Primary | ICD-10-CM

## 2024-05-21 PROCEDURE — G2211 COMPLEX E/M VISIT ADD ON: HCPCS | Mod: 95,,, | Performed by: NURSE PRACTITIONER

## 2024-05-21 PROCEDURE — 99214 OFFICE O/P EST MOD 30 MIN: CPT | Mod: 95,,, | Performed by: NURSE PRACTITIONER

## 2024-05-24 ENCOUNTER — E-VISIT (OUTPATIENT)
Dept: INTERNAL MEDICINE | Facility: CLINIC | Age: 54
End: 2024-05-24
Payer: COMMERCIAL

## 2024-05-24 DIAGNOSIS — J30.2 SEASONAL ALLERGIC RHINITIS, UNSPECIFIED TRIGGER: Primary | ICD-10-CM

## 2024-05-24 PROCEDURE — 99499 UNLISTED E&M SERVICE: CPT | Mod: ,,, | Performed by: INTERNAL MEDICINE

## 2024-05-28 RX ORDER — LORATADINE 10 MG/1
10 TABLET ORAL DAILY PRN
Qty: 30 TABLET | Refills: 2 | Status: SHIPPED | OUTPATIENT
Start: 2024-05-28

## 2024-05-28 NOTE — PROGRESS NOTES
Patient ID: Jamila Bolton is a 53 y.o. female.    Chief Complaint: URI (Entered automatically based on patient selection in Patient Portal.)    The patient initiated a request through [facility] on 5/24/2024 for evaluation and management with a chief complaint of URI (Entered automatically based on patient selection in Patient Portal.)     I evaluated the questionnaire submission on 5/28/2024.    [facility] Peq E-Visit Covid    5/24/2024 10:44 AM CDT - Filed by Patient   Do you agree to participate in an E-Visit? Yes   If you have any of the following symptoms, go to your local emergency room or call 911: I acknowledge   Are you pregnant, could you be pregnant, or are you breast feeding? None of the above   What is the main issue you would like addressed today? I need refill on my loratadine 10 mg tablet  Commonly known as: CLARITIN meds. The weather is affecting my allergies   Do you think you might have COVID or the Flu? No   Have you tested positive for COVID or Flu? No   What symptoms do you currently have?  Nasal Congestion;  Muscle or body aches   Have you ever smoked? I have never smoked   Have you had a fever? No   When did your symptoms first appear? 5/1/2024   In the last two weeks, have you been in close contact with someone who has COVID-19 or the Flu? No   List what you have done or taken to help your symptoms. Use my Clarintin   How severe are your symptoms? Moderate   Have your symptoms gotten better or worse since they started?  No change   Do you have transportation to get testing if it is needed and ordered for you at an [facility] location? Yes   Provide any additional information you feel is important. Im just requesting a refill on; loratadine 10 mg tablet; Commonly known as: CLARITIN   Please attach any relevant images or files    Are you able to take your vital signs? Yes   Systolic Blood Pressure:    Diastolic Blood Pressure:    Weight:    Height:    Pulse:    Temperature:    Respiration rate:     Pulse Oxygen:          Encounter Diagnosis   Name Primary?    Seasonal allergic rhinitis, unspecified trigger Yes        No orders of the defined types were placed in this encounter.     Medications Ordered This Encounter   Medications    loratadine (CLARITIN) 10 mg tablet     Sig: Take 1 tablet (10 mg total) by mouth daily as needed for Allergies.     Dispense:  30 tablet     Refill:  2        Follow up if symptoms worsen or fail to improve.      E-Visit Time Tracking:    Day 1 Time (in minutes): 2    Total Time (in minutes): 2

## 2024-07-01 ENCOUNTER — OFFICE VISIT (OUTPATIENT)
Dept: INTERNAL MEDICINE | Facility: CLINIC | Age: 54
End: 2024-07-01
Payer: COMMERCIAL

## 2024-07-01 VITALS
WEIGHT: 293 LBS | BODY MASS INDEX: 50.11 KG/M2 | OXYGEN SATURATION: 98 % | HEART RATE: 70 BPM | TEMPERATURE: 96 F | RESPIRATION RATE: 20 BRPM | DIASTOLIC BLOOD PRESSURE: 80 MMHG | SYSTOLIC BLOOD PRESSURE: 130 MMHG

## 2024-07-01 DIAGNOSIS — J45.40 MODERATE PERSISTENT ASTHMA WITHOUT COMPLICATION: ICD-10-CM

## 2024-07-01 DIAGNOSIS — M54.50 ACUTE MIDLINE LOW BACK PAIN WITHOUT SCIATICA: Primary | ICD-10-CM

## 2024-07-01 PROCEDURE — 99214 OFFICE O/P EST MOD 30 MIN: CPT | Mod: S$GLB,,, | Performed by: PHYSICIAN ASSISTANT

## 2024-07-01 PROCEDURE — 99999 PR PBB SHADOW E&M-EST. PATIENT-LVL III: CPT | Mod: PBBFAC,,, | Performed by: PHYSICIAN ASSISTANT

## 2024-07-01 RX ORDER — CYCLOBENZAPRINE HCL 10 MG
10 TABLET ORAL
COMMUNITY
Start: 2021-06-06 | End: 2024-07-01 | Stop reason: SDUPTHER

## 2024-07-01 RX ORDER — CYCLOBENZAPRINE HCL 10 MG
10 TABLET ORAL
Qty: 30 TABLET | Refills: 0 | Status: SHIPPED | OUTPATIENT
Start: 2024-07-01

## 2024-07-01 RX ORDER — ACETAMINOPHEN 325 MG/1
650 TABLET ORAL EVERY 6 HOURS PRN
COMMUNITY

## 2024-09-15 ENCOUNTER — PATIENT MESSAGE (OUTPATIENT)
Dept: INTERNAL MEDICINE | Facility: CLINIC | Age: 54
End: 2024-09-15
Payer: COMMERCIAL

## 2024-09-15 DIAGNOSIS — J45.40 MODERATE PERSISTENT ASTHMA WITHOUT COMPLICATION: ICD-10-CM

## 2024-09-16 DIAGNOSIS — J45.40 MODERATE PERSISTENT ASTHMA WITHOUT COMPLICATION: ICD-10-CM

## 2024-09-16 RX ORDER — ALBUTEROL SULFATE 90 UG/1
2 INHALANT RESPIRATORY (INHALATION) EVERY 6 HOURS PRN
Qty: 18 G | Refills: 3 | Status: SHIPPED | OUTPATIENT
Start: 2024-09-16

## 2024-09-16 NOTE — TELEPHONE ENCOUNTER
No care due was identified.  [facility] Embedded Care Due Messages. Reference number: [id number].   9/16/2024 7:42:34 AM CDT

## 2024-09-16 NOTE — TELEPHONE ENCOUNTER
No care due was identified.  [facility] Embedded Care Due Messages. Reference number: [id number].   9/16/2024 2:36:56 PM CDT

## 2024-09-16 NOTE — TELEPHONE ENCOUNTER
----- Message from Nohemi Arriaga sent at 9/16/2024  2:27 PM CDT -----  Contact: Jamila  Pt is calling in regards to getting a refill on Breyna inhaler and pt stated this was prescribed in December of 2023.please call back at .[phone]       [facility] [address] [facility] [employer] - [address] - [address]  [address] [place] [address]  Phone: [phone] Fax: [phone]    Thanks  RAJANI

## 2024-09-16 NOTE — TELEPHONE ENCOUNTER
No care due was identified.  [facility] Embedded Care Due Messages. Reference number: [id number].   9/16/2024 6:56:46 PM CDT

## 2024-09-17 RX ORDER — BUDESONIDE AND FORMOTEROL FUMARATE DIHYDRATE 80; 4.5 UG/1; UG/1
2 AEROSOL RESPIRATORY (INHALATION) 2 TIMES DAILY
Qty: 10.2 G | Refills: 2 | OUTPATIENT
Start: 2024-09-17

## 2024-09-17 RX ORDER — BUDESONIDE AND FORMOTEROL FUMARATE DIHYDRATE 80; 4.5 UG/1; UG/1
2 AEROSOL RESPIRATORY (INHALATION) 2 TIMES DAILY
Qty: 30.6 G | Refills: 1 | Status: SHIPPED | OUTPATIENT
Start: 2024-09-17

## 2024-09-17 NOTE — TELEPHONE ENCOUNTER
Refill Decision Note   Jamila Bolton  is requesting a refill authorization.  Brief Assessment and Rationale for Refill:  Quick Discontinue     Medication Therapy Plan:  Receipt confirmed by pharmacy (9/17/2024  2:59 PM CDT)      Comments:     Note composed:3:40 PM 09/17/2024

## 2024-09-17 NOTE — TELEPHONE ENCOUNTER
Refill Decision Note   Jamila Bolton  is requesting a refill authorization.  Brief Assessment and Rationale for Refill:  Approve     Medication Therapy Plan:         Comments:     Note composed:2:59 PM 09/17/2024             Appointments     Last Visit   5/24/2024 Skylar Apodaca DO   Next Visit   12/30/2024 Skylar Apodaca DO

## 2024-12-24 ENCOUNTER — OFFICE VISIT (OUTPATIENT)
Dept: INTERNAL MEDICINE | Facility: CLINIC | Age: 54
End: 2024-12-24
Payer: COMMERCIAL

## 2024-12-24 VITALS
HEART RATE: 82 BPM | TEMPERATURE: 98 F | SYSTOLIC BLOOD PRESSURE: 124 MMHG | RESPIRATION RATE: 20 BRPM | WEIGHT: 293 LBS | HEIGHT: 65 IN | DIASTOLIC BLOOD PRESSURE: 84 MMHG | BODY MASS INDEX: 48.82 KG/M2 | OXYGEN SATURATION: 97 %

## 2024-12-24 DIAGNOSIS — E55.9 VITAMIN D DEFICIENCY: ICD-10-CM

## 2024-12-24 DIAGNOSIS — J30.2 SEASONAL ALLERGIC RHINITIS, UNSPECIFIED TRIGGER: ICD-10-CM

## 2024-12-24 DIAGNOSIS — M54.50 CHRONIC MIDLINE LOW BACK PAIN WITHOUT SCIATICA: ICD-10-CM

## 2024-12-24 DIAGNOSIS — Z00.00 ANNUAL PHYSICAL EXAM: Primary | ICD-10-CM

## 2024-12-24 DIAGNOSIS — J45.40 MODERATE PERSISTENT ASTHMA WITHOUT COMPLICATION: ICD-10-CM

## 2024-12-24 DIAGNOSIS — G89.29 CHRONIC MIDLINE LOW BACK PAIN WITHOUT SCIATICA: ICD-10-CM

## 2024-12-24 PROCEDURE — 99396 PREV VISIT EST AGE 40-64: CPT | Mod: S$GLB,,, | Performed by: PHYSICIAN ASSISTANT

## 2024-12-24 PROCEDURE — 99999 PR PBB SHADOW E&M-EST. PATIENT-LVL V: CPT | Mod: PBBFAC,,, | Performed by: PHYSICIAN ASSISTANT

## 2024-12-24 RX ORDER — CHOLECALCIFEROL (VITAMIN D3) 1250 MCG
1 TABLET ORAL WEEKLY
Qty: 4 TABLET | Refills: 11 | Status: SHIPPED | OUTPATIENT
Start: 2024-12-24

## 2024-12-24 RX ORDER — LORATADINE 10 MG/1
10 TABLET ORAL DAILY
Qty: 90 TABLET | Refills: 3 | Status: SHIPPED | OUTPATIENT
Start: 2024-12-24

## 2024-12-24 NOTE — PROGRESS NOTES
"Subjective:      Patient ID: Jamila Bolton is a 54 y.o. female.    Chief Complaint: Annual Exam (She is here for an annual exam. Would like to discuss Airsupra inhaler and weight loss. )    Patient is known to me, being seen today for annual.    Due for labs     Asthma, daily symbicort, albuterol prn   Requests trial of airsupra     Reports intermittent low back pain when sit for long periods of time   Takes flexeril prn     H/o gastric bypass 2003, gained weight back when taking care of sick mom  Had also previously been prescribed weight loss medication, unsure of name    Wants to discuss weight loss options   Admits diet is poor, job is sedentary, back hinders her ability to do anything strenuous   Weight Dec 2023 304lb   Today 315lb     Last visit July 2024 w myself.      Review of Systems   Constitutional:  Negative for chills, diaphoresis and fever.   HENT:  Negative for congestion, rhinorrhea and sore throat.    Respiratory:  Negative for cough, shortness of breath and wheezing.    Gastrointestinal:  Negative for abdominal pain, constipation, diarrhea, nausea and vomiting.   Musculoskeletal:  Positive for back pain (low back, chronic, does not radiate).   Skin:  Negative for rash.   Neurological:  Negative for dizziness, light-headedness and headaches.       Objective:   /84 (BP Location: Right arm, Patient Position: Sitting)   Pulse 82   Temp 97.9 °F (36.6 °C) (Tympanic)   Resp 20   Ht 5' 5" (1.651 m)   Wt (!) 143.3 kg (315 lb 14.7 oz)   SpO2 97%   BMI 52.57 kg/m²   Physical Exam  Constitutional:       General: She is not in acute distress.     Appearance: Normal appearance. She is well-developed. She is not ill-appearing.   HENT:      Head: Normocephalic and atraumatic.      Right Ear: Hearing, tympanic membrane, ear canal and external ear normal.      Left Ear: Hearing, tympanic membrane, ear canal and external ear normal.      Nose: Nose normal.      Mouth/Throat:      Mouth: Mucous " membranes are moist.      Pharynx: Oropharynx is clear.   Cardiovascular:      Rate and Rhythm: Normal rate and regular rhythm.      Heart sounds: Normal heart sounds. No murmur heard.  Pulmonary:      Effort: Pulmonary effort is normal. No respiratory distress.      Breath sounds: Normal breath sounds. No decreased breath sounds.   Abdominal:      General: Bowel sounds are normal.      Palpations: Abdomen is soft.      Tenderness: There is no abdominal tenderness.   Musculoskeletal:      Cervical back: No bony tenderness.      Thoracic back: No bony tenderness.      Lumbar back: Bony tenderness present.      Right lower leg: No edema.      Left lower leg: No edema.   Skin:     General: Skin is warm and dry.      Findings: No rash.   Psychiatric:         Speech: Speech normal.         Behavior: Behavior normal.         Thought Content: Thought content normal.       Assessment:      1. Annual physical exam    2. Moderate persistent asthma without complication    3. Vitamin D deficiency    4. BMI 50.0-59.9, adult    5. Seasonal allergic rhinitis, unspecified trigger    6. Chronic midline low back pain without sciatica       Plan:   Annual physical exam  -     CBC Auto Differential; Future; Expected date: 12/24/2024  -     Comprehensive Metabolic Panel; Future; Expected date: 12/24/2024  -     Hemoglobin A1C; Future; Expected date: 12/24/2024  -     Lipid Panel; Future; Expected date: 12/24/2024  -     TSH; Future; Expected date: 12/24/2024  -     Insulin, random; Future; Expected date: 12/24/2024    Moderate persistent asthma without complication  -     Ambulatory referral/consult to Pulmonology; Future; Expected date: 12/31/2024    Vitamin D deficiency  -     cholecalciferol, vitamin D3, 1,250 mcg (50,000 unit) Tab; Take 1 tablet by mouth once a week.  Dispense: 4 tablet; Refill: 11  -     Misc Sendout Test, Blood Vit D; Future; Expected date: 12/24/2024    BMI 50.0-59.9, adult  -     Ambulatory referral/consult to  [facility]; Future; Expected date: 12/31/2024    Seasonal allergic rhinitis, unspecified trigger  -     loratadine (CLARITIN) 10 mg tablet; Take 1 tablet (10 mg total) by mouth once daily.  Dispense: 90 tablet; Refill: 3    Chronic midline low back pain without sciatica      Declines PT  Encourage warm heat, epsom soaks, flexeril prn     Fasting labs     1yr f/u or sooner if needed

## 2024-12-26 ENCOUNTER — LAB VISIT (OUTPATIENT)
Dept: LAB | Facility: HOSPITAL | Age: 54
End: 2024-12-26
Attending: PHYSICIAN ASSISTANT
Payer: COMMERCIAL

## 2024-12-26 DIAGNOSIS — Z00.00 ANNUAL PHYSICAL EXAM: ICD-10-CM

## 2024-12-26 DIAGNOSIS — E55.9 VITAMIN D DEFICIENCY: ICD-10-CM

## 2024-12-26 LAB
ALBUMIN SERPL BCP-MCNC: 3.8 G/DL (ref 3.5–5.2)
ALP SERPL-CCNC: 134 U/L (ref 40–150)
ALT SERPL W/O P-5'-P-CCNC: 14 U/L (ref 10–44)
ANION GAP SERPL CALC-SCNC: 8 MMOL/L (ref 8–16)
AST SERPL-CCNC: 21 U/L (ref 10–40)
BASOPHILS # BLD AUTO: 0.04 K/UL (ref 0–0.2)
BASOPHILS NFR BLD: 0.7 % (ref 0–1.9)
BILIRUB SERPL-MCNC: 1.2 MG/DL (ref 0.1–1)
BUN SERPL-MCNC: 17 MG/DL (ref 6–20)
CALCIUM SERPL-MCNC: 9.3 MG/DL (ref 8.7–10.5)
CHLORIDE SERPL-SCNC: 108 MMOL/L (ref 95–110)
CHOLEST SERPL-MCNC: 175 MG/DL (ref 120–199)
CHOLEST/HDLC SERPL: 2.3 {RATIO} (ref 2–5)
CO2 SERPL-SCNC: 25 MMOL/L (ref 23–29)
CREAT SERPL-MCNC: 0.9 MG/DL (ref 0.5–1.4)
DIFFERENTIAL METHOD BLD: ABNORMAL
EOSINOPHIL # BLD AUTO: 0.2 K/UL (ref 0–0.5)
EOSINOPHIL NFR BLD: 3.8 % (ref 0–8)
ERYTHROCYTE [DISTWIDTH] IN BLOOD BY AUTOMATED COUNT: 14.4 % (ref 11.5–14.5)
EST. GFR  (NO RACE VARIABLE): >60 ML/MIN/1.73 M^2
ESTIMATED AVG GLUCOSE: 108 MG/DL (ref 68–131)
GLUCOSE SERPL-MCNC: 94 MG/DL (ref 70–110)
HBA1C MFR BLD: 5.4 % (ref 4–5.6)
HCT VFR BLD AUTO: 38.5 % (ref 37–48.5)
HDLC SERPL-MCNC: 75 MG/DL (ref 40–75)
HDLC SERPL: 42.9 % (ref 20–50)
HGB BLD-MCNC: 11.9 G/DL (ref 12–16)
IMM GRANULOCYTES # BLD AUTO: 0.02 K/UL (ref 0–0.04)
IMM GRANULOCYTES NFR BLD AUTO: 0.4 % (ref 0–0.5)
INSULIN COLLECTION INTERVAL: NORMAL
INSULIN SERPL-ACNC: 6.8 UU/ML
LDLC SERPL CALC-MCNC: 91.2 MG/DL (ref 63–159)
LYMPHOCYTES # BLD AUTO: 1.6 K/UL (ref 1–4.8)
LYMPHOCYTES NFR BLD: 29.1 % (ref 18–48)
MCH RBC QN AUTO: 27.8 PG (ref 27–31)
MCHC RBC AUTO-ENTMCNC: 30.9 G/DL (ref 32–36)
MCV RBC AUTO: 90 FL (ref 82–98)
MONOCYTES # BLD AUTO: 0.5 K/UL (ref 0.3–1)
MONOCYTES NFR BLD: 9 % (ref 4–15)
NEUTROPHILS # BLD AUTO: 3.1 K/UL (ref 1.8–7.7)
NEUTROPHILS NFR BLD: 57 % (ref 38–73)
NONHDLC SERPL-MCNC: 100 MG/DL
NRBC BLD-RTO: 0 /100 WBC
PLATELET # BLD AUTO: 250 K/UL (ref 150–450)
PMV BLD AUTO: 11.9 FL (ref 9.2–12.9)
POTASSIUM SERPL-SCNC: 4.8 MMOL/L (ref 3.5–5.1)
PROT SERPL-MCNC: 7.1 G/DL (ref 6–8.4)
RBC # BLD AUTO: 4.28 M/UL (ref 4–5.4)
SODIUM SERPL-SCNC: 141 MMOL/L (ref 136–145)
TRIGL SERPL-MCNC: 44 MG/DL (ref 30–150)
TSH SERPL DL<=0.005 MIU/L-ACNC: 1.51 UIU/ML (ref 0.4–4)
WBC # BLD AUTO: 5.47 K/UL (ref 3.9–12.7)

## 2024-12-26 PROCEDURE — 36415 COLL VENOUS BLD VENIPUNCTURE: CPT | Performed by: PHYSICIAN ASSISTANT

## 2024-12-26 PROCEDURE — 80053 COMPREHEN METABOLIC PANEL: CPT | Performed by: PHYSICIAN ASSISTANT

## 2024-12-26 PROCEDURE — 85025 COMPLETE CBC W/AUTO DIFF WBC: CPT | Performed by: PHYSICIAN ASSISTANT

## 2024-12-26 PROCEDURE — 80061 LIPID PANEL: CPT | Performed by: PHYSICIAN ASSISTANT

## 2024-12-26 PROCEDURE — 84443 ASSAY THYROID STIM HORMONE: CPT | Performed by: PHYSICIAN ASSISTANT

## 2024-12-26 PROCEDURE — 82306 VITAMIN D 25 HYDROXY: CPT | Performed by: PHYSICIAN ASSISTANT

## 2024-12-26 PROCEDURE — 83036 HEMOGLOBIN GLYCOSYLATED A1C: CPT | Performed by: PHYSICIAN ASSISTANT

## 2024-12-26 PROCEDURE — 83525 ASSAY OF INSULIN: CPT | Performed by: PHYSICIAN ASSISTANT

## 2024-12-27 DIAGNOSIS — J45.909 ASTHMA, UNSPECIFIED ASTHMA SEVERITY, UNSPECIFIED WHETHER COMPLICATED, UNSPECIFIED WHETHER PERSISTENT: Primary | ICD-10-CM

## 2024-12-27 LAB — 25(OH)D3+25(OH)D2 SERPL-MCNC: 66 NG/ML (ref 30–96)

## 2025-03-04 PROBLEM — M47.812 CERVICAL FACET SYNDROME: Status: ACTIVE | Noted: 2025-03-04

## 2025-03-04 PROBLEM — M53.3 DISORDER OF SACRUM: Status: ACTIVE | Noted: 2025-03-04

## 2025-03-04 PROBLEM — J45.40 MODERATE PERSISTENT ASTHMA WITHOUT COMPLICATION: Status: ACTIVE | Noted: 2025-03-04

## 2025-03-04 PROBLEM — M51.26 DISPLACEMENT OF LUMBAR INTERVERTEBRAL DISC WITHOUT MYELOPATHY: Status: ACTIVE | Noted: 2025-03-04

## 2025-03-04 PROBLEM — M46.1 SACROILIITIS: Status: ACTIVE | Noted: 2025-03-04
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
## 2025-03-04 PROBLEM — M50.20 DISPLACEMENT OF CERVICAL INTERVERTEBRAL DISC: Status: ACTIVE | Noted: 2025-03-04

## 2025-03-04 PROBLEM — M43.10 ACQUIRED SPONDYLOLISTHESIS: Status: ACTIVE | Noted: 2025-03-04

## 2025-03-04 PROBLEM — H40.023 OPEN ANGLE WITH BORDERLINE FINDINGS AND HIGH GLAUCOMA RISK IN BOTH EYES: Status: ACTIVE | Noted: 2023-03-10

## 2025-04-01 PROBLEM — R06.81 APNEA: Status: ACTIVE | Noted: 2025-04-01

## 2025-04-01 PROBLEM — I10 ESSENTIAL HYPERTENSION, BENIGN: Status: ACTIVE | Noted: 2025-04-01

## 2025-04-23 ENCOUNTER — TELEPHONE (OUTPATIENT)
Dept: PULMONOLOGY | Facility: CLINIC | Age: 55
End: 2025-04-23
Payer: COMMERCIAL

## 2025-04-23 NOTE — TELEPHONE ENCOUNTER
Spoke with pt. Pt stated that she was out of network with [facility] and needed to get CT done at the imaging center on [address]. Advised pt that I will reach out to pre service and have them work on that . Appt on Monday has been canceled until pt has Ct done. She will call once done. Called pre service they are working on authorization to get CT done at the imaging center.

## 2025-04-23 NOTE — TELEPHONE ENCOUNTER
----- Message from Hanane sent at 4/23/2025  3:00 PM CDT -----  Regarding: Jamila  Who called:[place] is the request in detail: Patient needs orders for a ct scan to take to [facility] to have scan done there in order to stay within network, patient is scheduled due to see the doctor on 4/28/2025 and she would like to have the scan done before her appt please reach out to the patient Can the clinic reply by [facility]? NOWould the patient rather a call back or a response via [facility]? [facility] call back [phone] Information: